# Patient Record
Sex: FEMALE | Race: WHITE
[De-identification: names, ages, dates, MRNs, and addresses within clinical notes are randomized per-mention and may not be internally consistent; named-entity substitution may affect disease eponyms.]

---

## 2017-02-20 ENCOUNTER — HOSPITAL ENCOUNTER (INPATIENT)
Dept: HOSPITAL 17 - PHED | Age: 47
LOS: 4 days | Discharge: HOME | DRG: 440 | End: 2017-02-24
Attending: FAMILY MEDICINE | Admitting: FAMILY MEDICINE
Payer: COMMERCIAL

## 2017-02-20 VITALS
SYSTOLIC BLOOD PRESSURE: 131 MMHG | TEMPERATURE: 98.2 F | RESPIRATION RATE: 16 BRPM | HEART RATE: 99 BPM | DIASTOLIC BLOOD PRESSURE: 86 MMHG | OXYGEN SATURATION: 95 %

## 2017-02-20 VITALS
RESPIRATION RATE: 18 BRPM | OXYGEN SATURATION: 97 % | SYSTOLIC BLOOD PRESSURE: 133 MMHG | DIASTOLIC BLOOD PRESSURE: 67 MMHG | HEART RATE: 77 BPM | TEMPERATURE: 97.7 F

## 2017-02-20 VITALS
RESPIRATION RATE: 18 BRPM | OXYGEN SATURATION: 96 % | SYSTOLIC BLOOD PRESSURE: 117 MMHG | DIASTOLIC BLOOD PRESSURE: 59 MMHG | HEART RATE: 76 BPM

## 2017-02-20 VITALS
SYSTOLIC BLOOD PRESSURE: 118 MMHG | RESPIRATION RATE: 16 BRPM | TEMPERATURE: 99 F | OXYGEN SATURATION: 97 % | HEART RATE: 75 BPM | DIASTOLIC BLOOD PRESSURE: 68 MMHG

## 2017-02-20 VITALS
HEART RATE: 83 BPM | OXYGEN SATURATION: 97 % | DIASTOLIC BLOOD PRESSURE: 75 MMHG | RESPIRATION RATE: 18 BRPM | SYSTOLIC BLOOD PRESSURE: 132 MMHG

## 2017-02-20 VITALS — OXYGEN SATURATION: 97 %

## 2017-02-20 VITALS — HEIGHT: 64 IN | WEIGHT: 215.17 LBS | BODY MASS INDEX: 36.73 KG/M2

## 2017-02-20 DIAGNOSIS — E11.9: ICD-10-CM

## 2017-02-20 DIAGNOSIS — F32.9: ICD-10-CM

## 2017-02-20 DIAGNOSIS — K86.1: Primary | ICD-10-CM

## 2017-02-20 DIAGNOSIS — I10: ICD-10-CM

## 2017-02-20 DIAGNOSIS — E78.5: ICD-10-CM

## 2017-02-20 DIAGNOSIS — K29.70: ICD-10-CM

## 2017-02-20 DIAGNOSIS — J45.909: ICD-10-CM

## 2017-02-20 DIAGNOSIS — E66.9: ICD-10-CM

## 2017-02-20 DIAGNOSIS — F41.8: ICD-10-CM

## 2017-02-20 LAB
ALP SERPL-CCNC: 77 U/L (ref 45–117)
ALT SERPL-CCNC: 61 U/L (ref 10–53)
ANION GAP SERPL CALC-SCNC: 10 MEQ/L (ref 5–15)
AST SERPL-CCNC: 25 U/L (ref 15–37)
BASOPHILS # BLD AUTO: 0 TH/MM3 (ref 0–0.2)
BASOPHILS NFR BLD: 0.5 % (ref 0–2)
BILIRUB SERPL-MCNC: 0.2 MG/DL (ref 0.2–1)
BUN SERPL-MCNC: 13 MG/DL (ref 7–18)
CHLORIDE SERPL-SCNC: 102 MEQ/L (ref 98–107)
EOSINOPHIL # BLD: 0.2 TH/MM3 (ref 0–0.4)
EOSINOPHIL NFR BLD: 2.3 % (ref 0–4)
ERYTHROCYTE [DISTWIDTH] IN BLOOD BY AUTOMATED COUNT: 12.2 % (ref 11.6–17.2)
GFR SERPLBLD BASED ON 1.73 SQ M-ARVRAT: 83 ML/MIN (ref 89–?)
HCO3 BLD-SCNC: 27.5 MEQ/L (ref 21–32)
HCT VFR BLD CALC: 46 % (ref 35–46)
HDLC SERPL-MCNC: 57.9 MG/DL (ref 40–60)
HEMO FLAGS: (no result)
LDLC SERPL-MCNC: 107 MG/DL (ref 0–99)
LYMPHOCYTES # BLD AUTO: 2.7 TH/MM3 (ref 1–4.8)
LYMPHOCYTES NFR BLD AUTO: 32 % (ref 9–44)
MCH RBC QN AUTO: 29.8 PG (ref 27–34)
MCHC RBC AUTO-ENTMCNC: 33.5 % (ref 32–36)
MCV RBC AUTO: 89.1 FL (ref 80–100)
MONOCYTES NFR BLD: 6.8 % (ref 0–8)
NEUTROPHILS # BLD AUTO: 5 TH/MM3 (ref 1.8–7.7)
NEUTROPHILS NFR BLD AUTO: 58.4 % (ref 16–70)
PLATELET # BLD: 217 TH/MM3 (ref 150–450)
POTASSIUM SERPL-SCNC: 3.7 MEQ/L (ref 3.5–5.1)
RBC # BLD AUTO: 5.17 MIL/MM3 (ref 4–5.3)
SODIUM SERPL-SCNC: 139 MEQ/L (ref 136–145)
WBC # BLD AUTO: 8.5 TH/MM3 (ref 4–11)

## 2017-02-20 PROCEDURE — 80320 DRUG SCREEN QUANTALCOHOLS: CPT

## 2017-02-20 PROCEDURE — 84702 CHORIONIC GONADOTROPIN TEST: CPT

## 2017-02-20 PROCEDURE — 82390 ASSAY OF CERULOPLASMIN: CPT

## 2017-02-20 PROCEDURE — 82787 IGG 1 2 3 OR 4 EACH: CPT

## 2017-02-20 PROCEDURE — 80074 ACUTE HEPATITIS PANEL: CPT

## 2017-02-20 PROCEDURE — 83883 ASSAY NEPHELOMETRY NOT SPEC: CPT

## 2017-02-20 PROCEDURE — 78226 HEPATOBILIARY SYSTEM IMAGING: CPT

## 2017-02-20 PROCEDURE — 84460 ALANINE AMINO (ALT) (SGPT): CPT

## 2017-02-20 PROCEDURE — 82784 ASSAY IGA/IGD/IGG/IGM EACH: CPT

## 2017-02-20 PROCEDURE — 96374 THER/PROPH/DIAG INJ IV PUSH: CPT

## 2017-02-20 PROCEDURE — 83010 ASSAY OF HAPTOGLOBIN QUANT: CPT

## 2017-02-20 PROCEDURE — 80061 LIPID PANEL: CPT

## 2017-02-20 PROCEDURE — 74177 CT ABD & PELVIS W/CONTRAST: CPT

## 2017-02-20 PROCEDURE — 86316 IMMUNOASSAY TUMOR OTHER: CPT

## 2017-02-20 PROCEDURE — 80053 COMPREHEN METABOLIC PANEL: CPT

## 2017-02-20 PROCEDURE — 96361 HYDRATE IV INFUSION ADD-ON: CPT

## 2017-02-20 PROCEDURE — 82941 ASSAY OF GASTRIN: CPT

## 2017-02-20 PROCEDURE — 83690 ASSAY OF LIPASE: CPT

## 2017-02-20 PROCEDURE — 83036 HEMOGLOBIN GLYCOSYLATED A1C: CPT

## 2017-02-20 PROCEDURE — 86038 ANTINUCLEAR ANTIBODIES: CPT

## 2017-02-20 PROCEDURE — 82172 ASSAY OF APOLIPOPROTEIN: CPT

## 2017-02-20 PROCEDURE — 80076 HEPATIC FUNCTION PANEL: CPT

## 2017-02-20 PROCEDURE — 84311 SPECTROPHOTOMETRY: CPT

## 2017-02-20 PROCEDURE — 82728 ASSAY OF FERRITIN: CPT

## 2017-02-20 PROCEDURE — 83550 IRON BINDING TEST: CPT

## 2017-02-20 PROCEDURE — 85025 COMPLETE CBC W/AUTO DIFF WBC: CPT

## 2017-02-20 PROCEDURE — 74181 MRI ABDOMEN W/O CONTRAST: CPT

## 2017-02-20 PROCEDURE — 82977 ASSAY OF GGT: CPT

## 2017-02-20 PROCEDURE — 82533 TOTAL CORTISOL: CPT

## 2017-02-20 PROCEDURE — 82103 ALPHA-1-ANTITRYPSIN TOTAL: CPT

## 2017-02-20 PROCEDURE — 82247 BILIRUBIN TOTAL: CPT

## 2017-02-20 PROCEDURE — 76377 3D RENDER W/INTRP POSTPROCES: CPT

## 2017-02-20 PROCEDURE — 83540 ASSAY OF IRON: CPT

## 2017-02-20 PROCEDURE — 83516 IMMUNOASSAY NONANTIBODY: CPT

## 2017-02-20 PROCEDURE — 96375 TX/PRO/DX INJ NEW DRUG ADDON: CPT

## 2017-02-20 PROCEDURE — A9537 TC99M MEBROFENIN: HCPCS

## 2017-02-20 PROCEDURE — 83520 IMMUNOASSAY QUANT NOS NONAB: CPT

## 2017-02-20 PROCEDURE — 86256 FLUORESCENT ANTIBODY TITER: CPT

## 2017-02-20 PROCEDURE — 82948 REAGENT STRIP/BLOOD GLUCOSE: CPT

## 2017-02-20 PROCEDURE — 86003 ALLG SPEC IGE CRUDE XTRC EA: CPT

## 2017-02-20 RX ADMIN — SODIUM CHLORIDE AND POTASSIUM CHLORIDE SCH MLS/HR: 9; 1.49 INJECTION, SOLUTION INTRAVENOUS at 21:57

## 2017-02-20 NOTE — HHI.HP
HPI


Service


[facility]


Primary Care Physician


Arya White M.D.


Admission Diagnosis


pancreatitis


Chief Complaint:  


abd pain


Travel History


International Travel<30 Days:  No


Contact w/Intl Traveler <30 Da:  No


Traveled to Known Affected Are:  No


History of Present Illness


46-year-old female with history of depression, anxiety, diabetes and htn is 

here with complaint of abdominal pain.  Patient states that she has had 

approximately 2.5 days of upper abdominal pain greatest in the epigastrium and 

right upper quadrant that radiates around to the back.  +Associated nausea, no 

vomiting.  No hematemesis. + Associated diarrhea.  No hematochezia.  Nausea is 

more of a feature than the diarrhea. Patient states that she has a history of 

pancreatitis, status post cholecystectomy and this feels similar to when she 

had pancreatitis exacerbation before.  She's had recurrent pancreatitis even 

after her cholecystectomy and states that it has been due to "using too much 

ibuprofen".  She denies a history of heavy alcohol abuse and actually doesn't 

drink any alcohol at all.  Patient has not had any new changes in home 

medications, but has started eating more fatty foods of late.  Patient has 

tried Zofran at home with some improvement of her symptoms.  He is feeling much 

better now since being provided with IV antibiotics and pain medication.  She 

has had approximately 3 episodes of pancreatitis in the past since 2013.





Review of Systems


Constitutional:  COMPLAINS OF: Change in appetite


Eyes:  DENIES: Blurred vision, Diplopia, Eye inflammation, Eye pain, Vision loss

, Photosensitivity, Double Vision


Respiratory:  DENIES: Apneas, Cough, Snoring, Wheezing, Hemoptysis, Sputum 

production, Shortness of breath


Cardiovascular:  DENIES: Chest pain, Palpitations, Syncope, Dyspnea on Exertion

, PND, Lower Extremity Edema, Orthopnea, Claudication


Gastrointestinal:  COMPLAINS OF: Abdominal pain, Diarrhea, Nausea,  DENIES: 

Black stools, Bloody stools, BRB per rectum, Constipation, GERD, Reflux, 

Vomiting, Difficulty Swallowing, Anorexia, See HPI


Integumentary:  COMPLAINS OF: Abnormal pigmentation, Rash


Immunologic/allergic:  DENIES: Eczema, Urticaria


Neurologic:  DENIES: Abnormal gait, Headache, Localized weakness, Paresthesias, 

Seizures, Speech Problems, Tremor, Poor Balance





Past Family Social History


Past Medical History


Asthma


Type 2 diabetes


Hyperlipidemia


Hypertension


Gastritis


Lumbar degenerative disc disease


Obesity


Anxiety


Depression


Past Surgical History


Appendectomy


 section 


Laparoscopic cholecystectomy


Exploratory laparotomy at age 20


Hysterectomy bilateral salpingo-oophorectomy in 


Reported Medications


Cinnamon 500 Mg Cap 1,000 Mg PO DAILY


Gnp Melatonin (Melatonin) 3 Mg Tab 1 Tab PO DAILY


Keira (Keira (Zingiber Officinalis)) 500 Mg Cap 1 Cap PO DAILY


Glucosamine (Glucosamine Sulfate) 500 Mg Cap 500 Mg PO DAILY


Fish Oil (Omega-3 Fatty Acids) 500 Mg Cap 1 Cap PO DAILY


Mobic (Meloxicam) 7.5 Mg Tab 7.5 Mg PO DAILY


Trazodone (Trazodone HCl) 150 Mg Tab 150 Mg PO HS


Celexa (Citalopram Hydrobromide) 40 Mg Tab 60 Mg PO DAILY


Farxiga (Dapagliflozin) 10 Mg Tab 10 Mg PO DAILY


Metformin (Metformin HCl) 1,000 Mg Tab 1,000 Mg PO BIDPC


     With meals


Flexeril (Cyclobenzaprine HCl) 10 Mg Tab 10 Mg PO TID


Hydrocodone-Acetaminophen 7.5-325 mg Tab 1 Tab PO DAILY PRN


Symbicort Inh (Budesonide/Formoterol Fumarate) 160-4.5 Mcg/Act Aero 1 Puff INH 

Q12HR


Proventil Hfa 6.7 GM Inh (Albuterol Sulfate) 90 Mcg/Act Aer 1 Puff INH Q4H PRN


Metoprolol Tartrate 25 Mg Tab 25 Mg PO BID


Lisinopril 5 Mg Tab 5 Mg PO DAILY


Singulair 10mg daily


Incruse Elipta 62.5 mcg 1 puff daily


Allergies:  


Coded Allergies:  


     Morphine (Verified  Allergy, Intermediate, HIVES, 17)


     Sulfa (Verified  Allergy, Intermediate, HIVES, 17)


     Pyridium (Verified  Adverse Reaction, Intermediate, VOMITING, 17)


Family History


Asthma and a mother and sister


Abdominal aortic aneurysm in her father 


diabetes


Coronary artery disease


Hypertension


Major depression


Social History


Denies alcohol, tobacco or illicit drug use


Works as a  with [employer].  She is an RN by 

training.


She has twin sons age 17.  Originally from [place], she has been in the area for 

9 years.





Physical Exam


Vital Signs





 Vital Signs








  Date Time  Temp Pulse Resp B/P Pulse Ox O2 Delivery O2 Flow Rate FiO2


 


17 18:26   18     


 


17 17:30  83 18 132/75 97 Room Air  


 


17 17:29     97 Room Air  


 


17 16:49 98.2 99 16 131/86 95   








Physical Exam


GENERAL: This is a well-nourished, obese, well-developed patient, in no 

apparent distress.  Alert and oriented.


SKIN: Mild erythematous patches on arms and upper chest.. Cool and dry.


HEAD: Atraumatic. Normocephalic. No temporal or scalp tenderness.


EYES: Pupils equal round and reactive. Extraocular motions intact. No scleral 

icterus. No injection or drainage. 


ENT: Nose without bleeding, purulent drainage or septal hematoma. Airway patent.


NECK: Trachea midline. No JVD or lymphadenopathy. Supple, nontender, no 

meningeal signs.


CARDIOVASCULAR: Regular rate and rhythm without murmurs, gallops, or rubs. 


RESPIRATORY: Clear to auscultation. Breath sounds equal bilaterally. No wheezes

, rales, or rhonchi.  


GASTROINTESTINAL: Abdomen soft, nondistended. No hepato-splenomegaly, or 

palpable masses. No guarding.  Mild tenderness to palpation in epigastrium.  No 

rebound.  Bowel sounds normal.


MUSCULOSKELETAL: Extremities without clubbing, cyanosis, or edema. No joint 

tenderness, effusion, or edema noted. No calf tenderness. 


NEUROLOGICAL: Awake and alert. Cranial nerves II through XII intact.  Motor and 

sensory grossly within normal limits. Five out of 5 muscle strength in all 

muscle groups.  Normal speech.


Laboratory





Laboratory Tests








Test 17





 17:25


 


White Blood Count 8.5 


 


Red Blood Count 5.17 


 


Hemoglobin 15.4 


 


Hematocrit 46.0 


 


Mean Corpuscular Volume 89.1 


 


Mean Corpuscular Hemoglobin 29.8 


 


Mean Corpuscular Hemoglobin 33.5 





Concent 


 


Red Cell Distribution Width 12.2 


 


Platelet Count 217 


 


Mean Platelet Volume 8.6 


 


Neutrophils (%) (Auto) 58.4 


 


Lymphocytes (%) (Auto) 32.0 


 


Monocytes (%) (Auto) 6.8 


 


Eosinophils (%) (Auto) 2.3 


 


Basophils (%) (Auto) 0.5 


 


Neutrophils # (Auto) 5.0 


 


Lymphocytes # (Auto) 2.7 


 


Monocytes # (Auto) 0.6 


 


Eosinophils # (Auto) 0.2 


 


Basophils # (Auto) 0.0 


 


CBC Comment DIFF FINAL 


 


Differential Comment  


 


Sodium Level 139 


 


Potassium Level 3.7 


 


Chloride Level 102 


 


Carbon Dioxide Level 27.5 


 


Anion Gap 10 


 


Blood Urea Nitrogen 13 


 


Creatinine 0.76 


 


Estimat Glomerular Filtration 83 





Rate 


 


Random Glucose 188 


 


Calcium Level 8.7 


 


Total Bilirubin 0.2 


 


Aspartate Amino Transf 25 





(AST/SGOT) 


 


Alanine Aminotransferase 61 





(ALT/SGPT) 


 


Alkaline Phosphatase 77 


 


Total Protein 7.1 


 


Albumin 4.0 


 


Triglycerides Level 306 


 


Cholesterol Level 226 


 


LDL Cholesterol 107 


 


HDL Cholesterol 57.9 


 


Cholesterol/HDL Ratio 3.90 


 


Lipase 2389 


 


Ethyl Alcohol Level LESS THAN 3 








Result Diagram:  


17 1725                                                                   

             17 172








Assessment and Plan


Problem List:  


(1) Pancreatitis


Status:  Acute


Plan:  seems to be recurrent issue.


Will follow clinically. IVF, pain meds. NPO initially, we'll now advanced to 

clears as she seems clinically much improved.


We'll check imaging if symptoms recur.  She reports that she's had CT scanning 

done of her abdomen on several occasions including most recently around 2016 per her report.





(2) DM2 (diabetes mellitus, type 2)


Status:  Chronic


Plan:  A1c 7.3 in Oct 2016. Recheck.


SSI.





(3) Asthma


Status:  Chronic


Plan:  nebs. follow clinically. inhalers as at home.





(4) Hypertension


Status:  Chronic


Plan:  Well controlled. continue rx





(5) Major depression


Status:  Chronic


Plan:  continue rx





Code Status


Full


Discussed Condition With


Patient and ER physician





Physician Certification


2 Midnight Certification Type:  Admission for Inpatient Services


Order for Inpatient Services


The services are ordered in accordance with Medicare regulations or non-

Medicare payer requirements, as applicable.  In the case of services not 

specified as inpatient-only, they are appropriately provided as inpatient 

services in accordance with the 2-midnight benchmark.


Estimated LOS (days):  2


 days is the estimated time the patient will need to remain in the hospital, 

assuming treatment plan goals are met and no additional complications.


Post-Hospital Plan:  Home





Problem Qualifiers





(1) Pancreatitis:  


Qualified Code:  K85.90 - Acute pancreatitis without infection or necrosis, 

unspecified pancreatitis type


(2) Hypertension:  


Qualified Code:  I10 - Essential hypertension


(3) Major depression:  





Raji Marmolejo PhD MD 2017 19:46

## 2017-02-20 NOTE — PD
HPI


Chief Complaint:  GI Complaint


Time Seen by Provider:  17:00


Travel History


International Travel<30 days:  No


Contact w/Intl Traveler<30days:  No


Traveled to known affect area:  No





History of Present Illness


HPI


46-year-old female with history of depression, anxiety, diabetes here with 

complaint of abdominal pain.  Patient states that she has had approximately 2.5 

days of upper abdominal pain greatest in the epigastrium and right upper 

quadrant that radiates around to the back.  Associated nausea, vomiting.  No 

hematemesis.  Associated diarrhea.  No hematochezia.  The nausea and vomiting 

are certainly more prominent than her diarrhea.  Patient states that she has a 

history of pancreatitis, status post cholecystectomy and this feels similar.  

She's had recurrent pancreatitis even after her cholecystectomy and states that 

it has been due to "using too much ibuprofen".  She denies a history of heavy 

alcohol abuse.  Patient has not had any new changes in home medications.  

Patient has tried Zofran at home with some improvement of her symptoms.





PFSH


Past Medical History


Asthma:  Yes


Anxiety:  Yes


Depression:  Yes


Heart Rhythm Problems:  No


Cancer:  No


Cardiac Catheterization:  No


Cardiovascular Problems:  Yes


Diabetes:  Yes


Patient Takes Glucophage:  Yes


Diminished Hearing:  No


Gastrointestinal Disorders:  Yes


Genitourinary:  No


Hypertension:  Yes


Implanted Vascular Access Dvce:  No


Musculoskeletal:  No


Neurologic:  No


Psychiatric:  Yes


Reproductive:  Yes (HYSTERECTOMY)


Respiratory:  Yes


Immunizations Current:  Yes


Migraines:  No


Pancreatitis:  Yes


Thyroid Disease:  No


Influenza Vaccination:  Yes


Pregnant?:  Not Pregnant


:  1


Para:  1





Past Surgical History


Abdominal Surgery:  Yes (ENDOMETRIAL TISSUE REMOVED )


Appendectomy:  Yes


 Section:  Yes ()


Cholecystectomy:  Yes


Coronary Artery Bypass Graft:  No


Gynecologic Surgery:  Yes (EXPLORATORY LAP , EXPL LAP  C CAUTERIZATION  

)


Hysterectomy:  Yes


Pacemaker:  No


Other Surgery:  Yes (EXP LAP, EXP ABD SURGERY)





Social History


Alcohol Use:  Yes (RARELY)


Tobacco Use:  No


Substance Use:  No





Allergies-Medications


(Allergen,Severity, Reaction):  


Coded Allergies:  


     Morphine (Verified  Allergy, Intermediate, HIVES, 17)


     Sulfa (Verified  Allergy, Intermediate, HIVES, 17)


     Pyridium (Verified  Adverse Reaction, Intermediate, VOMITING, 17)


Reported Meds & Prescriptions





Reported Meds & Active Scripts


Active


Reported


Cinnamon 500 Mg Cap 1,000 Mg PO DAILY


Gnp Melatonin (Melatonin) 3 Mg Tab 1 Tab PO DAILY


Keira (Keira (Zingiber Officinalis)) 500 Mg Cap 1 Cap PO DAILY


Glucosamine (Glucosamine Sulfate) 500 Mg Cap 500 Mg PO DAILY


Fish Oil (Omega-3 Fatty Acids) 500 Mg Cap 1 Cap PO DAILY


Mobic (Meloxicam) 7.5 Mg Tab 7.5 Mg PO DAILY


Trazodone (Trazodone HCl) 150 Mg Tab 150 Mg PO HS


Celexa (Citalopram Hydrobromide) 40 Mg Tab 60 Mg PO DAILY


Farxiga (Dapagliflozin) 10 Mg Tab 10 Mg PO DAILY


Metformin (Metformin HCl) 1,000 Mg Tab 1,000 Mg PO BIDPC


     With meals


Flexeril (Cyclobenzaprine HCl) 10 Mg Tab 10 Mg PO TID


Hydrocodone-Acetaminophen 7.5-325 mg Tab 1 Tab PO DAILY PRN


Symbicort Inh (Budesonide/Formoterol Fumarate) 160-4.5 Mcg/Act Aero 1 Puff INH 

Q12HR


Breo Ellipta Inh (Fluticasone/Vilanterol) 100-25 Mcg/Act Inh 1 Puff INH DAILY


     Use daily at the same time.


Proventil Hfa 6.7 GM Inh (Albuterol Sulfate) 90 Mcg/Act Aer 1 Puff INH Q4H PRN


Metoprolol Tartrate 25 Mg Tab 25 Mg PO BID


Lisinopril 5 Mg Tab 5 Mg PO DAILY








Review of Systems


Except as stated in HPI:  all other systems reviewed are Neg





Physical Exam


Narrative


GENERAL: Well-appearing female in no acute distress


SKIN: Warm and dry.


HEAD:  Normocephalic. 


EYES:  No scleral icterus. No injection or drainage. 


ENT:  Mucous membranes pink and moist.


NECK: Supple


CARDIOVASCULAR: Regular rate and rhythm.  


RESPIRATORY: No accessory muscle use. 


GASTROINTESTINAL: Abdomen soft, right upper quadrant and epigastric abdominal 

pain without rebound or guarding, no CVA tenderness.  Obese.


MUSCULOSKELETAL: Normal gait


NEUROLOGICAL: Awake and alert.  Normal speech.


PSYCHIATRIC: Appropriate mood and affect; insight and judgment normal.





Data


Data


Last Documented VS





Vital Signs








  Date Time  Temp Pulse Resp B/P Pulse Ox O2 Delivery O2 Flow Rate FiO2


 


17 17:30  83 18 132/75 97 Room Air  


 


17 16:49 98.2       








Orders





 Complete Blood Count With Diff (17 17:05)


Comprehensive Metabolic Panel (17 17:05)


Lipase (17 17:05)


Iv Access Insert/Monitor (17 17:05)


Ecg Monitoring (17 17:05)


Oximetry (17 17:05)


Ondansetron Inj (Zofran Inj) (17 17:15)


Sodium Chlor 0.9% 1000 Ml Inj (Ns 1000 M (17 17:05)


Sodium Chloride 0.9% Flush (Ns Flush) (17 17:15)


Ketorolac Inj (Toradol Inj) (17 17:15)





Labs





 Laboratory Tests








Test 17





 17:25


 


White Blood Count 8.5 TH/MM3


 


Red Blood Count 5.17 MIL/MM3


 


Hemoglobin 15.4 GM/DL


 


Hematocrit 46.0 %


 


Mean Corpuscular Volume 89.1 FL


 


Mean Corpuscular Hemoglobin 29.8 PG


 


Mean Corpuscular Hemoglobin 33.5 %





Concent 


 


Red Cell Distribution Width 12.2 %


 


Platelet Count 217 TH/MM3


 


Mean Platelet Volume 8.6 FL


 


Neutrophils (%) (Auto) 58.4 %


 


Lymphocytes (%) (Auto) 32.0 %


 


Monocytes (%) (Auto) 6.8 %


 


Eosinophils (%) (Auto) 2.3 %


 


Basophils (%) (Auto) 0.5 %


 


Neutrophils # (Auto) 5.0 TH/MM3


 


Lymphocytes # (Auto) 2.7 TH/MM3


 


Monocytes # (Auto) 0.6 TH/MM3


 


Eosinophils # (Auto) 0.2 TH/MM3


 


Basophils # (Auto) 0.0 TH/MM3


 


CBC Comment DIFF FINAL 


 


Differential Comment  


 


Sodium Level 139 MEQ/L


 


Potassium Level 3.7 MEQ/L


 


Chloride Level 102 MEQ/L


 


Carbon Dioxide Level 27.5 MEQ/L


 


Anion Gap 10 MEQ/L


 


Blood Urea Nitrogen 13 MG/DL


 


Creatinine 0.76 MG/DL


 


Estimat Glomerular Filtration 83 ML/MIN





Rate 


 


Random Glucose 188 MG/DL


 


Calcium Level 8.7 MG/DL


 


Total Bilirubin 0.2 MG/DL


 


Aspartate Amino Transf 25 U/L





(AST/SGOT) 


 


Alanine Aminotransferase 61 U/L





(ALT/SGPT) 


 


Alkaline Phosphatase 77 U/L


 


Total Protein 7.1 GM/DL


 


Albumin 4.0 GM/DL


 


Lipase 2389 U/L











[facility]


Medical Decision Making


Medical Screen Exam Complete:  Yes


Emergency Medical Condition:  Yes


Medical Record Reviewed:  Yes


Differential Diagnosis


46-year-old female with history of pancreatitis, diabetes status post 

cholecystectomy here with right upper quadrant, epigastric abdominal pain with 

nausea vomiting and diarrhea for the last 2.5 days similar previous episodes of 

pancreatitis.  Differential includes GERD, gastritis, pancreatitis, 

hepatobiliary pathology, gastroenteritis, and less likely bowel obstruction, 

pyelonephritis.


Narrative Course


Patient placed on monitor, IV established and blood obtained.  Patient given 1 

L normal saline bolus, 4 mg Zofran, 30 mg Toradol.  CBC, CMP, lipase obtained 

and notable for lipase of 2389.  Patient is still quite uncomfortable with 

nausea, vomiting.  Blood alcohol level, liver panel added onto labs.  Patient 

will be admitted for nothing by mouth, IV fluids.





Diagnosis





 Primary Impression:  


 Pancreatitis


 Qualified Code:  K85.90 - Acute pancreatitis without infection or necrosis, 

unspecified pancreatitis type


 Additional Impression:  


 Abdominal pain


 Qualified Code:  R10.10 - Pain of upper abdomen





Admitting Information


Admitting Physician Requests:  Admit








Ann Chauhan MD 2017 17:38

## 2017-02-21 VITALS
OXYGEN SATURATION: 96 % | TEMPERATURE: 97.9 F | SYSTOLIC BLOOD PRESSURE: 140 MMHG | RESPIRATION RATE: 18 BRPM | DIASTOLIC BLOOD PRESSURE: 87 MMHG | HEART RATE: 83 BPM

## 2017-02-21 VITALS — OXYGEN SATURATION: 97 %

## 2017-02-21 VITALS
RESPIRATION RATE: 18 BRPM | TEMPERATURE: 96.9 F | DIASTOLIC BLOOD PRESSURE: 84 MMHG | OXYGEN SATURATION: 96 % | HEART RATE: 69 BPM | SYSTOLIC BLOOD PRESSURE: 128 MMHG

## 2017-02-21 VITALS
TEMPERATURE: 98.2 F | SYSTOLIC BLOOD PRESSURE: 132 MMHG | RESPIRATION RATE: 18 BRPM | OXYGEN SATURATION: 96 % | HEART RATE: 69 BPM | DIASTOLIC BLOOD PRESSURE: 81 MMHG

## 2017-02-21 VITALS
TEMPERATURE: 98 F | RESPIRATION RATE: 18 BRPM | SYSTOLIC BLOOD PRESSURE: 113 MMHG | DIASTOLIC BLOOD PRESSURE: 52 MMHG | HEART RATE: 76 BPM | OXYGEN SATURATION: 97 %

## 2017-02-21 VITALS — OXYGEN SATURATION: 96 %

## 2017-02-21 VITALS
RESPIRATION RATE: 18 BRPM | DIASTOLIC BLOOD PRESSURE: 85 MMHG | HEART RATE: 70 BPM | TEMPERATURE: 98.1 F | SYSTOLIC BLOOD PRESSURE: 121 MMHG | OXYGEN SATURATION: 99 %

## 2017-02-21 VITALS
RESPIRATION RATE: 16 BRPM | DIASTOLIC BLOOD PRESSURE: 76 MMHG | OXYGEN SATURATION: 97 % | SYSTOLIC BLOOD PRESSURE: 119 MMHG | HEART RATE: 75 BPM

## 2017-02-21 LAB
ALP SERPL-CCNC: 51 U/L (ref 45–117)
ALT SERPL-CCNC: 72 U/L (ref 10–53)
ANION GAP SERPL CALC-SCNC: 7 MEQ/L (ref 5–15)
AST SERPL-CCNC: 44 U/L (ref 15–37)
BETA HCG QUANT: 3 MIU/ML (ref 0–5)
BILIRUB SERPL-MCNC: 0.5 MG/DL (ref 0.2–1)
BUN SERPL-MCNC: 13 MG/DL (ref 7–18)
CHLORIDE SERPL-SCNC: 109 MEQ/L (ref 98–107)
GFR SERPLBLD BASED ON 1.73 SQ M-ARVRAT: 107 ML/MIN (ref 89–?)
HCO3 BLD-SCNC: 25.6 MEQ/L (ref 21–32)
HEMOGLOBIN A1A: 1.1 %
HEMOGLOBIN A1B: 1.2 %
HEMOGLOBIN AO: 82.5 %
HEMOGLOBIN LA1C: 2.3 %
HEMOGLOBIN P3: 3.9 %
HGB F MFR BLD: 1.8 %
POTASSIUM SERPL-SCNC: 4.5 MEQ/L (ref 3.5–5.1)
SODIUM SERPL-SCNC: 142 MEQ/L (ref 136–145)

## 2017-02-21 RX ADMIN — ONDANSETRON PRN MG: 2 INJECTION, SOLUTION INTRAMUSCULAR; INTRAVENOUS at 07:44

## 2017-02-21 RX ADMIN — Medication SCH MG: at 21:13

## 2017-02-21 RX ADMIN — CLONIDINE HYDROCHLORIDE PRN MG: 0.1 INJECTION, SOLUTION EPIDURAL at 07:44

## 2017-02-21 RX ADMIN — INSULIN ASPART SCH: 100 INJECTION, SOLUTION INTRAVENOUS; SUBCUTANEOUS at 11:00

## 2017-02-21 RX ADMIN — INSULIN ASPART SCH: 100 INJECTION, SOLUTION INTRAVENOUS; SUBCUTANEOUS at 16:00

## 2017-02-21 RX ADMIN — HYDROCODONE BITARTRATE AND ACETAMINOPHEN PRN TAB: 5; 325 TABLET ORAL at 17:07

## 2017-02-21 RX ADMIN — METOPROLOL TARTRATE SCH MG: 25 TABLET, FILM COATED ORAL at 21:13

## 2017-02-21 RX ADMIN — CYCLOBENZAPRINE HYDROCHLORIDE SCH MG: 10 TABLET, FILM COATED ORAL at 17:06

## 2017-02-21 RX ADMIN — SODIUM CHLORIDE, PRESERVATIVE FREE SCH ML: 5 INJECTION INTRAVENOUS at 09:00

## 2017-02-21 RX ADMIN — SODIUM CHLORIDE, PRESERVATIVE FREE SCH ML: 5 INJECTION INTRAVENOUS at 20:05

## 2017-02-21 RX ADMIN — CYCLOBENZAPRINE HYDROCHLORIDE SCH MG: 10 TABLET, FILM COATED ORAL at 12:25

## 2017-02-21 RX ADMIN — INSULIN ASPART SCH: 100 INJECTION, SOLUTION INTRAVENOUS; SUBCUTANEOUS at 06:34

## 2017-02-21 RX ADMIN — HYDROCODONE BITARTRATE AND ACETAMINOPHEN PRN TAB: 5; 325 TABLET ORAL at 09:29

## 2017-02-21 RX ADMIN — BUDESONIDE AND FORMOTEROL FUMARATE DIHYDRATE SCH PUFF: 160; 4.5 AEROSOL RESPIRATORY (INHALATION) at 09:05

## 2017-02-21 RX ADMIN — INSULIN ASPART SCH: 100 INJECTION, SOLUTION INTRAVENOUS; SUBCUTANEOUS at 21:00

## 2017-02-21 RX ADMIN — SODIUM CHLORIDE AND POTASSIUM CHLORIDE SCH MLS/HR: 9; 1.49 INJECTION, SOLUTION INTRAVENOUS at 07:44

## 2017-02-21 RX ADMIN — METOPROLOL TARTRATE SCH MG: 25 TABLET, FILM COATED ORAL at 09:02

## 2017-02-21 RX ADMIN — CITALOPRAM SCH MG: 40 TABLET, FILM COATED ORAL at 21:14

## 2017-02-21 RX ADMIN — BUDESONIDE AND FORMOTEROL FUMARATE DIHYDRATE SCH PUFF: 160; 4.5 AEROSOL RESPIRATORY (INHALATION) at 21:13

## 2017-02-21 NOTE — RADHPO
EXAM DATE/TIME:  02/21/2017 14:40 

 

[facility] COMPARISON:     

No previous studies available for comparison.

       

 

 

INDICATIONS :     

Abdominal pain.

                     

 

MEDICAL HISTORY :     

Hypertension. Diabetes mellitus type 2.   

 

SURGICAL HISTORY :     

Cholecystectomy. Hysterectomy.   

 

ENCOUNTER:     

Initial

 

ACUITY:     

2 day

 

PAIN SCORE:     

4/10

 

LOCATION:       

upper quadrant Abdomen

 

TECHNIQUE:     

Multiplanar, multisequence magnetic resonance imaging of the abdomen was performed.  High-resolution 
3D dataset was utilized to reconstruct maximum-intensity projection (MIP) images.

 

FINDINGS:     

 

INTRAHEPATIC BILE DUCTS:     

Within normal limits. No significant anatomical variant is present.

 

EXTRAHEPATIC BILE DUCTS:     

The common bile duct measures 4 mm. No stone or filling defect is identified.

 

GALLBLADDER:     

No stones, wall thickening, or pericholecystic fluid.

 

LIVER:     

Normal size and signal intensity. No concerning liver lesion is identified on this non-contrast exam.


 

PANCREAS:     

The main pancreatic duct is normal in size.  There is no significant anatomical variant.  Signal inte
nsity is within normal limits.  No mass is visualized on this non-contrast exam.

 

OTHER:     

The remaining visualized structures demonstrate no acute abnormality on this non-contrast exam. There
 is loss of signal on axial opposed phase imaging.

 

CONCLUSION:     

1. Unremarkable MRCP.

2. No intra-or extrahepatic biliary ductal dilatation.

3. Mild fatty infiltration of the liver.

 

 

 

 Stanford Sanders MD on February 21, 2017 at 16:21           

Board Certified Radiologist.

 This report was verified electronically.

## 2017-02-21 NOTE — HHI.PR
Subjective


Remarks


Patient felt relatively well overnight and started having pain again in the 

right upper quadrant and epigastric area about 1 hour ago.


She prefers to avoid opiates if possible given a history of opiate addiction.  

She uses Norco at home very sparingly and has a pain contract with her outside 

primary care physician.


No nausea or vomiting overnight.  She has never had an MRCP and this is her 

fourth episode of pancreatitis in the last 3-1/2 years.





Objective


Vitals





 Vital Signs








  Date Time  Temp Pulse Resp B/P Pulse Ox O2 Delivery O2 Flow Rate FiO2


 


2/21/17 03:30 98.0 76 18 113/52 97 Room Air  


 


2/21/17 01:50     96   21


 


2/20/17 23:20  76 18 117/59 96 Room Air  


 


2/20/17 22:00 97.7 77 18 133/67 97 Room Air  


 


2/20/17 19:30 99.0 75 16 118/68 97 Room Air  


 


2/20/17 19:30   16     


 


2/20/17 18:26   18     


 


2/20/17 17:30  83 18 132/75 97 Room Air  


 


2/20/17 17:29     97 Room Air  


 


2/20/17 16:49 98.2 99 16 131/86 95   














 2/20/17 2/20/17 2/21/17





 15:00 23:00 07:00


 


Intake Total  250 ml 


 


Balance  250 ml 


 


   


 


Intake Oral  250 ml 


 


# Voids  1 








GENERAL: Standing by the side of the bed in mild distress regarding right upper 

quadrant pain.  Alert and oriented.  Cooperative with exam.


SKIN: Warm and dry.  Minimal erythematous patches on lateral arms, improved 

from previous exam.


HEAD: Normocephalic.


EYES: No scleral icterus. No injection or drainage. 


NECK: Supple, trachea midline. No JVD or lymphadenopathy.


CARDIOVASCULAR: Regular rate and rhythm without murmurs, gallops, or rubs. 


RESPIRATORY: Breath sounds equal bilaterally. No accessory muscle use.


GASTROINTESTINAL: Abdomen soft, nondistended.  Moderate tenderness in right 

upper quadrant with voluntary guarding.  No rebound.  Bowel sounds normal.


MUSCULOSKELETAL: No cyanosis, or edema. 


BACK: Nontender without obvious deformity. No CVA tenderness.





Result Diagram:  


2/20/17 1725                                                                   

             2/21/17 0630





Urinary Catheter:  No


Vascular Central Line Catheter:  No





A/P


Problem List:  


(1) Pancreatitis


Status:  Acute


Plan:  seems to be recurrent issue.


Will follow clinically. IVF, pain meds. 


Recurrence of pain this morning.  We'll check MRCP and have GI see the patient 

since the recurrent issue.


Patient prefers not opiate medications for pain management if possible.





(2) DM2 (diabetes mellitus, type 2)


Status:  Chronic


Plan:  A1c 7.3 in Oct 2016. Recheck.


SSI.





(3) Asthma


Status:  Chronic


Plan:  nebs. follow clinically. inhalers as at home.





(4) Hypertension


Status:  Chronic


Plan:  Well controlled. continue rx





(5) Major depression


Status:  Chronic


Plan:  continue rx





Discharge Planning


Hopefully discharge in next 1-2 days.





Problem Qualifiers





(1) Pancreatitis:  


Qualified Code:  K85.90 - Acute pancreatitis without infection or necrosis, 

unspecified pancreatitis type


(2) Hypertension:  


Qualified Code:  I10 - Essential hypertension


(3) Major depression:  





Raji Marmolejo PhD MD Feb 21, 2017 07:00

## 2017-02-22 VITALS
RESPIRATION RATE: 18 BRPM | SYSTOLIC BLOOD PRESSURE: 157 MMHG | OXYGEN SATURATION: 97 % | DIASTOLIC BLOOD PRESSURE: 95 MMHG | TEMPERATURE: 97.4 F | HEART RATE: 77 BPM

## 2017-02-22 VITALS
DIASTOLIC BLOOD PRESSURE: 85 MMHG | RESPIRATION RATE: 20 BRPM | OXYGEN SATURATION: 95 % | SYSTOLIC BLOOD PRESSURE: 129 MMHG | HEART RATE: 71 BPM | TEMPERATURE: 98.2 F

## 2017-02-22 VITALS
SYSTOLIC BLOOD PRESSURE: 142 MMHG | DIASTOLIC BLOOD PRESSURE: 85 MMHG | RESPIRATION RATE: 18 BRPM | TEMPERATURE: 98.8 F | HEART RATE: 77 BPM | OXYGEN SATURATION: 96 %

## 2017-02-22 VITALS
DIASTOLIC BLOOD PRESSURE: 88 MMHG | TEMPERATURE: 97.7 F | OXYGEN SATURATION: 97 % | HEART RATE: 78 BPM | SYSTOLIC BLOOD PRESSURE: 144 MMHG | RESPIRATION RATE: 18 BRPM

## 2017-02-22 LAB
ALP SERPL-CCNC: 46 U/L (ref 45–117)
ALT SERPL-CCNC: 78 U/L (ref 10–53)
ANION GAP SERPL CALC-SCNC: 8 MEQ/L (ref 5–15)
AST SERPL-CCNC: 40 U/L (ref 15–37)
BILIRUB SERPL-MCNC: 0.4 MG/DL (ref 0.2–1)
BUN SERPL-MCNC: 8 MG/DL (ref 7–18)
CHLORIDE SERPL-SCNC: 109 MEQ/L (ref 98–107)
FERRITIN SERPL-MCNC: 94 NG/ML (ref 8–252)
GFR SERPLBLD BASED ON 1.73 SQ M-ARVRAT: 120 ML/MIN (ref 89–?)
HCO3 BLD-SCNC: 27.1 MEQ/L (ref 21–32)
IGG SERPL-MCNC: 621 MG/DL (ref 670–1640)
POTASSIUM SERPL-SCNC: 4.1 MEQ/L (ref 3.5–5.1)
SODIUM SERPL-SCNC: 144 MEQ/L (ref 136–145)
TRANSFERRIN IRON PROFILE: 252 MG/DL (ref 200–360)

## 2017-02-22 RX ADMIN — CYCLOBENZAPRINE HYDROCHLORIDE SCH MG: 10 TABLET, FILM COATED ORAL at 17:23

## 2017-02-22 RX ADMIN — HYDROCODONE BITARTRATE AND ACETAMINOPHEN PRN TAB: 5; 325 TABLET ORAL at 06:21

## 2017-02-22 RX ADMIN — CITALOPRAM SCH MG: 40 TABLET, FILM COATED ORAL at 21:26

## 2017-02-22 RX ADMIN — ONDANSETRON PRN MG: 2 INJECTION, SOLUTION INTRAMUSCULAR; INTRAVENOUS at 06:22

## 2017-02-22 RX ADMIN — CLONIDINE HYDROCHLORIDE PRN MG: 0.1 INJECTION, SOLUTION EPIDURAL at 13:40

## 2017-02-22 RX ADMIN — HYDROCODONE BITARTRATE AND ACETAMINOPHEN PRN TAB: 5; 325 TABLET ORAL at 21:28

## 2017-02-22 RX ADMIN — ONDANSETRON PRN MG: 2 INJECTION, SOLUTION INTRAMUSCULAR; INTRAVENOUS at 12:38

## 2017-02-22 RX ADMIN — INSULIN ASPART SCH: 100 INJECTION, SOLUTION INTRAVENOUS; SUBCUTANEOUS at 21:00

## 2017-02-22 RX ADMIN — CYCLOBENZAPRINE HYDROCHLORIDE SCH MG: 10 TABLET, FILM COATED ORAL at 12:36

## 2017-02-22 RX ADMIN — BUDESONIDE AND FORMOTEROL FUMARATE DIHYDRATE SCH PUFF: 160; 4.5 AEROSOL RESPIRATORY (INHALATION) at 21:25

## 2017-02-22 RX ADMIN — METOPROLOL TARTRATE SCH MG: 25 TABLET, FILM COATED ORAL at 08:48

## 2017-02-22 RX ADMIN — HYDROCODONE BITARTRATE AND ACETAMINOPHEN PRN TAB: 5; 325 TABLET ORAL at 12:37

## 2017-02-22 RX ADMIN — Medication SCH MG: at 21:26

## 2017-02-22 RX ADMIN — SODIUM CHLORIDE, PRESERVATIVE FREE SCH ML: 5 INJECTION INTRAVENOUS at 08:51

## 2017-02-22 RX ADMIN — CYCLOBENZAPRINE HYDROCHLORIDE SCH MG: 10 TABLET, FILM COATED ORAL at 08:49

## 2017-02-22 RX ADMIN — SODIUM CHLORIDE, PRESERVATIVE FREE SCH ML: 5 INJECTION INTRAVENOUS at 21:29

## 2017-02-22 RX ADMIN — INSULIN ASPART SCH: 100 INJECTION, SOLUTION INTRAVENOUS; SUBCUTANEOUS at 06:05

## 2017-02-22 RX ADMIN — INSULIN ASPART SCH: 100 INJECTION, SOLUTION INTRAVENOUS; SUBCUTANEOUS at 16:00

## 2017-02-22 RX ADMIN — BUDESONIDE AND FORMOTEROL FUMARATE DIHYDRATE SCH PUFF: 160; 4.5 AEROSOL RESPIRATORY (INHALATION) at 08:49

## 2017-02-22 RX ADMIN — METOPROLOL TARTRATE SCH MG: 25 TABLET, FILM COATED ORAL at 21:25

## 2017-02-22 RX ADMIN — INSULIN ASPART SCH: 100 INJECTION, SOLUTION INTRAVENOUS; SUBCUTANEOUS at 11:41

## 2017-02-22 NOTE — HHI.PR
Subjective


Remarks


Feeling somewhat better today.  Slept well last night.


Less nausea and less pain.  Wants to try advancing diet.





Objective


Vitals





 Vital Signs








  Date Time  Temp Pulse Resp B/P Pulse Ox O2 Delivery O2 Flow Rate FiO2


 


2/21/17 20:45     97   21


 


2/21/17 20:00 96.9 69 18 128/84 96   


 


2/21/17 16:42     96   21


 


2/21/17 16:00 98.1 70 18 121/85 99   


 


2/21/17 12:00 98.2 69 18 132/81 96   


 


2/21/17 08:50 97.9 83 18 140/87 96   


 


2/21/17 07:15  75 16 119/76 97 Room Air  














 2/21/17 2/21/17 2/22/17





 15:00 23:00 07:00


 


Intake Total 1000 ml 800 ml 


 


Balance 1000 ml 800 ml 


 


   


 


Intake Oral 0 ml  


 


IV Total 1000 ml 800 ml 


 


# Voids 2  








GENERAL: Sleeping but arouses to voice.  Appears comfortable in no apparent 

distress.  Alert and oriented.  Cooperative with exam.


SKIN: Warm and dry.  Minimal erythematous patches on lateral arms which is 

stable with previous exam..


HEAD: Normocephalic.


EYES: No scleral icterus. No injection or drainage. 


NECK: Supple, trachea midline. No JVD or lymphadenopathy.


CARDIOVASCULAR: Regular rate and rhythm without murmurs, gallops, or rubs. 


RESPIRATORY: Breath sounds equal bilaterally. No accessory muscle use.


GASTROINTESTINAL: Abdomen soft, nondistended.  Mild tenderness in right upper 

quadrant.  No guarding.  No rebound.  Bowel sounds normal.


MUSCULOSKELETAL: No cyanosis, or edema. 


BACK: Nontender without obvious deformity. No CVA tenderness.


Result Diagram:  


2/20/17 1725                                                                   

             2/21/17 0630





Urinary Catheter:  No


Vascular Central Line Catheter:  No





A/P


Problem List:  


(1) Pancreatitis


Status:  Acute


Plan:  seems to be recurrent issue.


Will follow clinically. IVF, pain meds. 


MRCP negative.  GI is on the case.  Multiple labs pending.


Medically improved.  We'll advance diet.  If she tolerates bland diet, plan 

discharge home later today.





(2) DM2 (diabetes mellitus, type 2)


Status:  Chronic


Plan:  A1c 7.3 in Oct 2016, now 6.7. SSI and hospital.  Resume outpatient meds 

after discharge.





(3) Asthma


Status:  Chronic


Plan:  nebs. follow clinically. inhalers as at home.





(4) Hypertension


Status:  Chronic


Plan:  Well controlled. continue rx





(5) Major depression


Status:  Chronic


Plan:  continue rx





Discharge Planning


Hopefully discharge later today depending on her capacity to tolerate advanced 

diet.





Problem Qualifiers





(1) Pancreatitis:  


Qualified Code:  K85.90 - Acute pancreatitis without infection or necrosis, 

unspecified pancreatitis type


(2) Hypertension:  


Qualified Code:  I10 - Essential hypertension


(3) Major depression:  





Raji Marmolejo PhD MD Feb 22, 2017 05:54

## 2017-02-22 NOTE — HHI.DS
Discharge Summary


Admission Date


Feb 20, 2017 at 18:18


Discharge Date:  Feb 24, 2017


Admitting Diagnosis


pancreatitis





(1) Pancreatitis


Diagnosis:  Principal





(2) DM2 (diabetes mellitus, type 2)


Diagnosis:  Secondary





(3) Asthma


Diagnosis:  Secondary





(4) Hypertension


Diagnosis:  Secondary





(5) Major depression


Diagnosis:  Secondary





Consultants


Gastroenterology, Dr. MYLES


Brief History


46-year-old female with history of depression, anxiety, diabetes and htn is 

here with complaint of abdominal pain.  Patient states that she has had 

approximately 2.5 days of upper abdominal pain greatest in the epigastrium and 

right upper quadrant that radiates around to the back.  +Associated nausea, no 

vomiting.  No hematemesis. + Associated diarrhea.  No hematochezia.  Nausea is 

more of a feature than the diarrhea. Patient states that she has a history of 

pancreatitis, status post cholecystectomy and this feels similar to when she 

had pancreatitis exacerbation before.  She's had recurrent pancreatitis even 

after her cholecystectomy and states that it has been due to "using too much 

ibuprofen".  She denies a history of heavy alcohol abuse and actually doesn't 

drink any alcohol at all.  Patient has not had any new changes in home 

medications, but has started eating more fatty foods of late.  Patient has 

tried Zofran at home with some improvement of her symptoms.  He is feeling much 

better now since being provided with IV antibiotics and pain medication.  She 

has had approximately 3 episodes of pancreatitis in the past since 2013.


CBC/BMP:  


2/20/17 1725                                                                   

             2/21/17 0630





Significant Findings





Laboratory Tests








Test 2/20/17 2/21/17





 17:25 06:30


 


Hemoglobin 15.4 GM/DL 





 (11.6-15.3) 


 


Estimat Glomerular Filtration 83 ML/MIN (>89) 





Rate  


 


Random Glucose 188 MG/ MG/DL





 () ()


 


Alanine Aminotransferase 61 U/L (10-53) 72 U/L (10-53)





(ALT/SGPT)  


 


Triglycerides Level 306 MG/DL 





 () 


 


Cholesterol Level 226 MG/DL 





 (120-200) 


 


LDL Cholesterol 107 MG/DL 





 (0-99) 


 


Lipase 2389 U/L 1424 U/L





 () ()


 


Hemoglobin A1c 6.7 % (4.3-6.0) 


 


Chloride Level  109 MEQ/L





  ()


 


Calcium Level  8.1 MG/DL





  (8.5-10.1)


 


Aspartate Amino Transf  44 U/L (15-37)





(AST/SGOT)  


 


Total Protein  5.9 GM/DL





  (6.4-8.2)


 


Albumin  3.3 GM/DL





  (3.4-5.0)








Hospital Course


Patient presented with epigastric and right upper quadrant pain.  Her lipase 

was elevated and she was determined to have pancreatitis which is a recurrent 

problem for her.  She was given IV fluids and pain medications.  She improved 

somewhat but had a recurrence of her pain on hospital day #2.  She was 

evaluated by gastroenterology.  MRCP was negative.  Multiple labs were ordered 

in attempts to determine the etiology of her recurrent pancreatitis.  Many of 

those labs are still pending at time of d/c. Pt had recurrence of abd pain 

after eating. Pancreatic enzymes were added due to chronicity and recurrence of 

pancreatitis. CT and HIDA negative on day of d/c. Patient clinically improved 

and tolerated oral intake after addition of pancreatic enzymes.


Pt Condition on Discharge:  Stable


Discharge Disposition:  Discharge Home


Discharge Instructions


DIET: Follow Instructions for:  Diabetic Diet (low fat, bland initially), 

Gluten Free Diet


Speech Therapy-Diet Recommends:  Regular


Activities you can perform:  Regular-No Restrictions


Follow up Referrals:  


Gastroenterology


PCP Follow-up





New Orders:  


COMP MET PROF (CMP) - 2-3 Days


LIPASE - 2-3 Days





New Medications:  


Hydrocodone-Acetaminophen (Hydrocodone-Acetaminophen) 5-325 mg Tab


1 TAB PO Q8HR PRN breakthrough pain #15 Ref 0 TAB


Pantoprazole (Pantoprazole) 40 Mg Tab


40 MG PO DAILY Reflux #30 Ref 0 TAB


Dicyclomine (Bentyl) 10 Mg Cap


10 MG PO TID PRN SPASM #30 CAP


Pancrelipase (Creon) 24,000-76,000-120,000 Units Cap


2 CAP PO TID pancreatitis #180 CAP


 


Continued Medications:  


Albuterol 6.7 GM Inh (Proventil Hfa 6.7 GM Inh) 90 Mcg/Act Aer


1 PUFF INH Q4H PRN SHORTNESS OF BREATH #1 Ref 0 INHALER


Budesonide-Formoterol Inh (Symbicort Inh) 160-4.5 Mcg/Act Aero


1 PUFF INH Q12HR #1 Ref 0 INHALER


Cinnamon (Cinnamon) 500 Mg Cap


1000 MG PO DAILY #1 BOTTLE


Citalopram (Celexa) 40 Mg Tab


60 MG PO DAILY Control Depression #30 Ref 0 TAB


Cyclobenzaprine (Flexeril) 10 Mg Tab


10 MG PO TID Muscle Spasm #90 Ref 0 TAB


Dapagliflozin (Farxiga) 10 Mg Tab


10 MG PO DAILY Blood Sugar Management #30 Ref 0 TAB


Fluticasone-Vilanterol Inh (Breo Ellipta Inh) 100-25 Mcg/Act Inh


1 PUFF INH DAILY Use daily at the same time. #1 Ref 0 INHALER


Keira (Zingiber Officinalis) (Keira) 500 Mg Cap


1 CAP PO DAILY


Glucosamine (Glucosamine) 500 Mg Cap


500 MG PO DAILY Herbal Supplements Ref 0 CAP


Hydrocodone-Acetaminophen (Hydrocodone-Acetaminophen) 7.5-325 mg Tab


1 TAB PO DAILY PRN PAIN Ref 0 TAB


Lisdexamfetamine (Vyvanse) 30 Mg Cap


30 MG PO DAILY #30 Ref 0 CAP


Lisinopril (Lisinopril) 5 Mg Tab


5 MG PO DAILY Blood Pressure Management #30 Ref 0 TAB


Melatonin (Gnp Melatonin) 3 Mg Tab


1 TAB PO DAILY


Meloxicam (Mobic) 7.5 Mg Tab


7.5 MG PO DAILY Pain #31 Ref 0 TAB (This prescription has been renewed)


Metformin (Metformin) 1,000 Mg Tab


1000 MG PO BIDPC With meals Blood Sugar Management #60 Ref 0 TAB (This 

prescription has been renewed)


Metoprolol Tartrate (Metoprolol Tartrate) 25 Mg Tab


25 MG PO BID #60 Ref 0 TAB


Omega-3 Fatty Acids (Fish Oil) 500 Mg Cap


1 CAP PO DAILY


Trazodone (Trazodone) 150 Mg Tab


150 MG PO HS Control Depression #30 Ref 0 TAB











Raji Marmolejo PhD MD Feb 22, 2017 06:02

## 2017-02-22 NOTE — MB
cc:

CHARLEY MYLES M.D.

****

 

 

DATE OF CONSULTATION

17

 

REFERRING PHYSICIAN

Dr. Marmolejo

 

REASON FOR CONSULTATION

Recurrent pancreatitis.

 

HISTORY OF PRESENT ILLNESS

Ms. Estrada is a very pleasant 46-year-old lady admitted to the hospital with

recurrent pancreatitis.  The patient had abdominal pain, diarrhea, nausea but

no vomiting.  The pain was in epigastrium radiating to the back.   The patient

had similar episodes in the past. First one was in 2013- had one  recurrence 

afterwards. She had similar episode.  She had her gallbladder removed in 2013.

She denies any alcohol intake or any new medications.  She stated she does use

paleo diet /low fat but recently she had some butter and chicken skin that may 
have

contained more fat than normal.  She was evaluated in the past by Dr. Pepper for

what appeared to be elevated liver enzymes.  She was found to have elevation of

the smooth muscle antibody, was recommended a liver biopsy but that was never

done. During her cholecystectomy, no liver biopsy was performed.  She is also

currently seen by Dr. Lozoya for skin rash and joint pains also dating back

in .  She stated all the testing was negative so far.  She was also seen by

dermatology for the skin rash -  the idea of possible skin biopsy was

entertained but not done yet.  She is trying to lose weight and she tried a new

supplement that she started to a few weeks ago _glucomannan.

 

PAST MEDICAL HISTORY

1.  Asthma

2.  Obesity

3.  Type 2 diabetes,

4.  Hyperlipidemia,

5.  Hypertension.

6.  Gastritis

7.  Lumbar degenerative disease

8.  Anxiety depression.

 

PAST SURGICAL HISTORY

1.  ,

2.  Appendectomy,

3.  Cholecystectomy,

4.  Exploratory laparotomy at age 20,

5.  Hysterectomy.

 

MEDICATIONS

1.  Cinnamon

2.  Melatonin

3.  Keira

4.  Glucosamine

5.  Fish oil

6.  Mobic.

7.  Trazodone

8.  Celexa

9.  Farxiga

10. Metformin.

11.  Flexeril.

12.  Hydrocodone.

13.  Symbicort.

14.  Proventil.

15.  Metoprolol

16.  Lisinopril

17.  Singulair

18.  Alimta

 

ALLERGIES

MORPHINE

SULFA

PYRIDIUM  f

 

FAMILY HISTORY

Asthma,  diabetes, coronary artery disease, hypertension.

 

SOCIAL HISTORY

Denies any smoking, drinking or drug use.  Works as a  for [employer].

 

REVIEW OF SYSTEMS

She denies any fever or chills, weight loss or weight gain.

ENT:  No alteration in baseline hearing or visual acuity PULMONARY:  Denies any

chest pain, shortness of breath.  GASTROINTESTINAL:  As above.

GENITOURINARY::  Denies dysuria, hematuria.

HEMATOLOGIC:  No history of anemia or bleeding disorder.

SKIN:  No alteration in baseline skin lesion.

NEUROLOGIC:  No history of TIA or CVA kind of symptoms.

 

PHYSICAL EXAMINATION

GENERAL:  On clinical exam, she is sitting comfortably in bed in no acute

distress.

VITAL SIGNS: Temperature is 98.1, pulse 70, respiration 18, blood pressure

121/85, pulse ox 99.

HEENT: Pupils equal, round, reactive to light and accommodation.  NECK:  No

JVD.  No lymphadenopathy.

CHEST:  Clear to the auscultation and palpation.

CARDIOVASCULAR:  S1, S2.  No murmur.

ABDOMEN: Soft, obese.  Bowel sounds are present.

CNS:  Awake, alert, oriented x3.  No focal signs identified.  SKIN:   She has a

rash on her chest, arms.  She does have a cushingoid appearance.

 

White count is 8.5, hemoglobin 15.4, platelets 217.  PT/INR normal.  Her AST is

45, ALT 72, total protein 5.9, ______ 3.3,  triglycerides 306, cholesterol 226.

Her lipase was 389, currently 1424.  The patient had an MRCP which was

suggestive of fatty liver otherwise negative.

 

IMAGING STUDIES

CT abdomen and pelvis  was suggestive of normal CT,  no etiology for the

patient's clinical symptoms found, some significant fecal material throughout

the colon.

 

IMPRESSION

Recurrent pancreatitis, unclear etiology.  Does suspect medication-induced

versus biliary sludge, less likely autoimmune process but needs to be excluded.

History of peptic ulcer disease secondary to  NSAIDS. 

Elevation of the liver enzymes most likely fatty liver. 

History of elevation of the smooth muscle

antibody.

 

RECOMMENDATIONS

Advance diet as tolerated.  

EGD with endoscopic ultrasound as an outpatient.

We are going to send also hepatitis profile, antimitochondrial antibody, celiac

panel, iron ferritin , ceruloplasmin, alpha one antitrypsin, food allergies, 
liver

fibrosis, IgG 4:smooth muscle antibody and ARSEN were sent. 

Weight  loss.  

Avoid fatty foods. 

If discharged, followup in the office for further evaluation and

treatment. 

I would like to thank Dr. Marmolejo for referring her to our office

for consultation. 

Further recommendation will depend on the patient's clinical

status and the above results.

 

 

 

                            _________________________________

                             MD TRINITY Allen/

D:  2017/6:17 PM

T:  2017/8:34 AM

Visit #:  [id number]

Job #:  [id number]

[facility]

## 2017-02-23 VITALS
HEART RATE: 66 BPM | TEMPERATURE: 97.5 F | RESPIRATION RATE: 18 BRPM | SYSTOLIC BLOOD PRESSURE: 131 MMHG | OXYGEN SATURATION: 97 % | DIASTOLIC BLOOD PRESSURE: 82 MMHG

## 2017-02-23 VITALS
SYSTOLIC BLOOD PRESSURE: 121 MMHG | HEART RATE: 80 BPM | RESPIRATION RATE: 18 BRPM | TEMPERATURE: 98.2 F | DIASTOLIC BLOOD PRESSURE: 80 MMHG | OXYGEN SATURATION: 95 %

## 2017-02-23 VITALS
RESPIRATION RATE: 20 BRPM | TEMPERATURE: 98.3 F | SYSTOLIC BLOOD PRESSURE: 145 MMHG | OXYGEN SATURATION: 97 % | HEART RATE: 78 BPM | DIASTOLIC BLOOD PRESSURE: 93 MMHG

## 2017-02-23 LAB
ALP SERPL-CCNC: 71 U/L (ref 45–117)
ALT SERPL-CCNC: 134 U/L (ref 10–53)
ANION GAP SERPL CALC-SCNC: 10 MEQ/L (ref 5–15)
AST SERPL-CCNC: 70 U/L (ref 15–37)
BILIRUB SERPL-MCNC: 0.4 MG/DL (ref 0.2–1)
BUN SERPL-MCNC: 6 MG/DL (ref 7–18)
CHLORIDE SERPL-SCNC: 105 MEQ/L (ref 98–107)
GFR SERPLBLD BASED ON 1.73 SQ M-ARVRAT: 111 ML/MIN (ref 89–?)
HCO3 BLD-SCNC: 30.1 MEQ/L (ref 21–32)
POTASSIUM SERPL-SCNC: 3.7 MEQ/L (ref 3.5–5.1)
SODIUM SERPL-SCNC: 145 MEQ/L (ref 136–145)

## 2017-02-23 RX ADMIN — SODIUM CHLORIDE, PRESERVATIVE FREE SCH ML: 5 INJECTION INTRAVENOUS at 21:00

## 2017-02-23 RX ADMIN — SODIUM CHLORIDE, PRESERVATIVE FREE SCH ML: 5 INJECTION INTRAVENOUS at 12:58

## 2017-02-23 RX ADMIN — INSULIN ASPART SCH: 100 INJECTION, SOLUTION INTRAVENOUS; SUBCUTANEOUS at 06:11

## 2017-02-23 RX ADMIN — INSULIN ASPART SCH: 100 INJECTION, SOLUTION INTRAVENOUS; SUBCUTANEOUS at 16:00

## 2017-02-23 RX ADMIN — BUDESONIDE AND FORMOTEROL FUMARATE DIHYDRATE SCH PUFF: 160; 4.5 AEROSOL RESPIRATORY (INHALATION) at 07:45

## 2017-02-23 RX ADMIN — HYDROCODONE BITARTRATE AND ACETAMINOPHEN PRN TAB: 5; 325 TABLET ORAL at 12:57

## 2017-02-23 RX ADMIN — PANCRELIPASE SCH CAP: 24000; 76000; 120000 CAPSULE, DELAYED RELEASE PELLETS ORAL at 18:16

## 2017-02-23 RX ADMIN — INSULIN ASPART SCH: 100 INJECTION, SOLUTION INTRAVENOUS; SUBCUTANEOUS at 11:00

## 2017-02-23 RX ADMIN — CYCLOBENZAPRINE HYDROCHLORIDE SCH MG: 10 TABLET, FILM COATED ORAL at 17:51

## 2017-02-23 RX ADMIN — BUDESONIDE AND FORMOTEROL FUMARATE DIHYDRATE SCH PUFF: 160; 4.5 AEROSOL RESPIRATORY (INHALATION) at 21:00

## 2017-02-23 RX ADMIN — CYCLOBENZAPRINE HYDROCHLORIDE SCH MG: 10 TABLET, FILM COATED ORAL at 12:58

## 2017-02-23 RX ADMIN — METOPROLOL TARTRATE SCH MG: 25 TABLET, FILM COATED ORAL at 07:45

## 2017-02-23 RX ADMIN — HYDROCODONE BITARTRATE AND ACETAMINOPHEN PRN TAB: 5; 325 TABLET ORAL at 20:18

## 2017-02-23 RX ADMIN — Medication SCH MG: at 21:00

## 2017-02-23 RX ADMIN — INSULIN ASPART SCH: 100 INJECTION, SOLUTION INTRAVENOUS; SUBCUTANEOUS at 21:00

## 2017-02-23 RX ADMIN — CYCLOBENZAPRINE HYDROCHLORIDE SCH MG: 10 TABLET, FILM COATED ORAL at 07:45

## 2017-02-23 RX ADMIN — CITALOPRAM SCH MG: 40 TABLET, FILM COATED ORAL at 21:00

## 2017-02-23 RX ADMIN — METOPROLOL TARTRATE SCH MG: 25 TABLET, FILM COATED ORAL at 21:00

## 2017-02-23 NOTE — HHI.GIFU
GI Follow-up Note


Consult Follow-up


Subjective:  Patient laying in bed comfortably, today had multiple loose stools

, some fat seen in toilet bowl, abdominal pain, crampy like after eating .No 

nausea, vomiting , tolerated diet relatively well.Lipase went up slightly .she 

states she  gets episodes like this at home on/off 





Objective:


PHYSICAL EXAMINATION:


Vitals signs stable


No fever





 Vital Signs








  Date Time  Temp Pulse Resp B/P Pulse Ox O2 Delivery O2 Flow Rate FiO2


 


2/23/17 13:57   20     


 


2/23/17 12:00 97.5 66 18 131/82 97   








HEENT: Pupils round and reactive to light; normocephalic; atraumatic; no 

jaundice.  Throat is clear.


NECK: Neck is supple, no JVD, no lymphadenopathy.


CHEST:  Chest is clear to auscultation and percussion.


CARDIAC:  Regular rate and rhythm with no murmur gallop or rubs.


ABDOMEN:  Soft, nondistended, nontender; no hepatosplenomegaly; bowel sounds 

are present in all four quadrants,obese


EXTREMITIES: No clubbing, cyanosis, or edema.


SKIN:  Normal;  rash; no jaundice.


CNS:  No focal deficits; alert and oriented times three.


Available Data (labs, X- Rays, Procedues) : 





 Laboratory Tests








Test 2/21/17 2/22/17 2/23/17





 19:30 05:40 05:30


 


Iron Level 76 MCG/DL  


 


Total Iron Binding Capacity 353 MCG/DL  


 


Percent Iron Saturation 21.5 %  


 


Ferritin 94 NG/ML  


 


Random Cortisol 2.1 MCG/DL  


 


Immunoglobulin G Total 621 MG/DL  


 


Hepatitis A IgM Antibody NEGATIVE   


 


Hepatitis B Surface Antigen NEGATIVE   


 


Hepatitis B Core IgM Antibody NEGATIVE   


 


Hepatitis C Antibody NEGATIVE   


 


Alpha-1-Antitrypsin 111 mg/dL  


 


Sodium Level  144 MEQ/L 145 MEQ/L


 


Potassium Level  4.1 MEQ/L 3.7 MEQ/L


 


Chloride Level  109 MEQ/L 105 MEQ/L


 


Carbon Dioxide Level  27.1 MEQ/L 30.1 MEQ/L


 


Anion Gap  8 MEQ/L 10 MEQ/L


 


Blood Urea Nitrogen  8 MG/DL 6 MG/DL


 


Creatinine  0.55 MG/DL 0.59 MG/DL


 


Estimat Glomerular Filtration  120 ML/ ML/MIN





Rate   


 


Random Glucose  144 MG/ MG/DL


 


Calcium Level  8.4 MG/DL 8.8 MG/DL


 


Total Bilirubin  0.4 MG/DL 0.4 MG/DL


 


Aspartate Amino Transf  40 U/L 70 U/L





(AST/SGOT)   


 


Alanine Aminotransferase  78 U/L 134 U/L





(ALT/SGPT)   


 


Alkaline Phosphatase  46 U/L 71 U/L


 


Total Protein  5.9 GM/DL 6.6 GM/DL


 


Albumin  3.2 GM/DL 3.7 GM/DL


 


Lipase  692 U/L 1766 U/L














ASSESSMENT/PLAN:


recurrent pancreatis unclear etiology possible biliary, medication?


Possible pancreatic insufficiency based on stool description


elevated lfts, most likely secondary fatty liver





Recommendations


hida scan  to evaluate biliary tree-s/p cholecystectomy


start pancreatic enzymes with meals 


stool fat qualitative


stool c.diff


eus op=ordered


fu labs 


chromogranin, gastrin, porphyrins add to current labs


low cortisol-may need work-up op 


It was a pleasure seeing Jill Estrada. Thank you for this consult.


Entered by: Janina Singh MD Feb 23, 2017 17:22

## 2017-02-23 NOTE — HHI.PR
Subjective


Remarks


Feeling much better today.  Had recurrence of pain yesterday when she ate the 

advanced diet.


Slept well overnight.  No nausea.





Objective


Vitals





 Vital Signs








  Date Time  Temp Pulse Resp B/P Pulse Ox O2 Delivery O2 Flow Rate FiO2


 


2/22/17 22:43   18     


 


2/22/17 20:00 98.2 71 20 129/85 95   


 


2/22/17 16:00 97.7 78 18 144/88 97   


 


2/22/17 12:00 97.4 77 18 157/95 97   


 


2/22/17 08:00 98.8 77 18 142/85 96   














 2/22/17 2/22/17 2/23/17





 15:00 23:00 07:00


 


Intake Total  1440 ml 480 ml


 


Balance  1440 ml 480 ml


 


   


 


Intake Oral  1440 ml 480 ml


 


# Voids  6 3


 


# Bowel Movements  0 0








GENERAL: Appears comfortable in no apparent distress.  Alert and oriented.  

Cooperative with exam.


SKIN: Warm and dry.  


HEAD: Normocephalic.


EYES: No scleral icterus. No injection or drainage. 


NECK: Supple, trachea midline. No JVD or lymphadenopathy.


CARDIOVASCULAR: Regular rate and rhythm without murmurs, gallops, or rubs. 


RESPIRATORY: Breath sounds equal bilaterally. No accessory muscle use.


GASTROINTESTINAL: Abdomen soft, nondistended.  No tenderness to palpation on 

today's exam.  No guarding.  No rebound.  Bowel sounds normal.


MUSCULOSKELETAL: No cyanosis, or edema. 


BACK: Nontender without obvious deformity. No CVA tenderness.


Result Diagram:  


2/20/17 1725                                                                   

             2/23/17 0530





Urinary Catheter:  No


Vascular Central Line Catheter:  No





A/P


Problem List:  


(1) Pancreatitis


Status:  Acute


Plan:  seems to be recurrent issue.


Clinically much improved this morning but transaminases and lipase are elevated 

again.


MRCP negative.  GI is on the case.  Multiple labs pending guarding possible 

etiology of her recurrent pancreatitis.


We'll try diet again this morning.  If she tolerates well hopefully discharge 

home later today with close outpatient follow-up.





(2) DM2 (diabetes mellitus, type 2)


Status:  Chronic


Plan:  A1c 7.3 in Oct 2016, now 6.7. SSI in hospital.  Resume outpatient meds 

after discharge.





(3) Asthma


Status:  Chronic


Plan:  nebs. follow clinically. inhalers as at home.





(4) Hypertension


Status:  Chronic


Plan:  Well controlled. continue rx





(5) Major depression


Status:  Chronic


Plan:  continue rx





Discharge Planning


Hopefully discharge later today depending on her capacity to tolerate advanced 

diet.


Will need close follow-up as outpatient with labs and primary care.





Problem Qualifiers





(1) Pancreatitis:  


Qualified Code:  K85.90 - Acute pancreatitis without infection or necrosis, 

unspecified pancreatitis type


(2) Hypertension:  


Qualified Code:  I10 - Essential hypertension


(3) Major depression:  





Raji Marmolejo PhD MD Feb 23, 2017 07:46

## 2017-02-24 VITALS
RESPIRATION RATE: 17 BRPM | SYSTOLIC BLOOD PRESSURE: 148 MMHG | TEMPERATURE: 99 F | OXYGEN SATURATION: 96 % | DIASTOLIC BLOOD PRESSURE: 92 MMHG | HEART RATE: 81 BPM

## 2017-02-24 VITALS
DIASTOLIC BLOOD PRESSURE: 84 MMHG | HEART RATE: 76 BPM | OXYGEN SATURATION: 97 % | SYSTOLIC BLOOD PRESSURE: 134 MMHG | RESPIRATION RATE: 18 BRPM | TEMPERATURE: 97.3 F

## 2017-02-24 VITALS
HEART RATE: 84 BPM | TEMPERATURE: 98.2 F | DIASTOLIC BLOOD PRESSURE: 88 MMHG | OXYGEN SATURATION: 98 % | RESPIRATION RATE: 20 BRPM | SYSTOLIC BLOOD PRESSURE: 146 MMHG

## 2017-02-24 LAB
ALMOND CLASS: 0
ALMOND: (no result) KU/L
ALP SERPL-CCNC: 80 U/L (ref 45–117)
ALT SERPL-CCNC: 108 U/L (ref 10–53)
AST SERPL-CCNC: 38 U/L (ref 15–37)
BILIRUB INDIRECT SERPL-MCNC: 0.3 MG/DL (ref 0–0.8)
BILIRUB SERPL-MCNC: 0.4 MG/DL (ref 0.2–1)
CASHEW CLASS: 0
DEPRECATED CODFISH IGE RAST QL: 0
DEPRECATED PEANUT IGE RAST QL: 0
DEPRECATED SCALLOP IGE RAST QL: 0
DEPRECATED SESAME SEED IGE RAST QL: 0
DEPRECATED SHRIMP IGE RAST QL: 0
DEPRECATED SOYBEAN IGE RAST QL: 0
DEPRECATED WALNUT IGE RAST QL: 0
DEPRECATED WHEAT IGE RAST QL: 0
EGG WHITE IGE QN: 0.13 KU/L
FIBROSIS STAGE: (no result)
GGT SERPL-CCNC: 118 U/L (ref 3–55)
HAPTOGLOB SERPL-MCNC: 58 MG/DL (ref 43–212)
HAZELNUT CLASS: 0
HAZELNUT: (no result) KU/L
IGA SERPL-MCNC: 195 MG/DL (ref 81–463)
Lab: (no result)
Lab: 0.5 MG/DL (ref 0.2–1.2)
Lab: 193 MG/DL (ref 106–279)
Lab: 68 U/L (ref 6–29)
MILK IGE QN: 0.16 KU/L
MITOCHONDRIA AB SER QL: (no result) U
NECROINFLAMMAT ACT SCORE: 0.41
PEANUT IGE QN: (no result) KU/L
REFERENCE ID: (no result)
SALMON CLASS: 0
SALMON: (no result) KU/L
SCALLOP IGE QN: (no result) KU/L
SESAME SEED IGE QN: (no result) KU/L
SHRIMP IGE QN: (no result) KU/L
SOYBEAN IGE QN: (no result) KU/L
TISSUE TRANSGLUTAMINASE AB IGG: (no result) U/ML
TUNA CLASS: 0
TUNA: (no result) KU/L
WALNUT IGE QN: (no result) KU/L
WHEAT IGE QN: (no result) KU/L

## 2017-02-24 RX ADMIN — SODIUM CHLORIDE, PRESERVATIVE FREE SCH ML: 5 INJECTION INTRAVENOUS at 08:58

## 2017-02-24 RX ADMIN — METOPROLOL TARTRATE SCH MG: 25 TABLET, FILM COATED ORAL at 08:57

## 2017-02-24 RX ADMIN — INSULIN ASPART SCH: 100 INJECTION, SOLUTION INTRAVENOUS; SUBCUTANEOUS at 06:22

## 2017-02-24 RX ADMIN — PANCRELIPASE SCH CAP: 24000; 76000; 120000 CAPSULE, DELAYED RELEASE PELLETS ORAL at 12:19

## 2017-02-24 RX ADMIN — CYCLOBENZAPRINE HYDROCHLORIDE SCH MG: 10 TABLET, FILM COATED ORAL at 12:19

## 2017-02-24 RX ADMIN — PANCRELIPASE SCH CAP: 24000; 76000; 120000 CAPSULE, DELAYED RELEASE PELLETS ORAL at 09:00

## 2017-02-24 RX ADMIN — INSULIN ASPART SCH: 100 INJECTION, SOLUTION INTRAVENOUS; SUBCUTANEOUS at 11:00

## 2017-02-24 RX ADMIN — ONDANSETRON PRN MG: 2 INJECTION, SOLUTION INTRAMUSCULAR; INTRAVENOUS at 12:46

## 2017-02-24 RX ADMIN — HYDROCODONE BITARTRATE AND ACETAMINOPHEN PRN TAB: 5; 325 TABLET ORAL at 12:19

## 2017-02-24 RX ADMIN — ONDANSETRON PRN MG: 2 INJECTION, SOLUTION INTRAMUSCULAR; INTRAVENOUS at 06:09

## 2017-02-24 RX ADMIN — BUDESONIDE AND FORMOTEROL FUMARATE DIHYDRATE SCH PUFF: 160; 4.5 AEROSOL RESPIRATORY (INHALATION) at 08:57

## 2017-02-24 RX ADMIN — HYDROCODONE BITARTRATE AND ACETAMINOPHEN PRN TAB: 5; 325 TABLET ORAL at 06:13

## 2017-02-24 RX ADMIN — CYCLOBENZAPRINE HYDROCHLORIDE SCH MG: 10 TABLET, FILM COATED ORAL at 08:57

## 2017-02-24 NOTE — RADHPO
EXAM DATE/TIME:  2017 08:07 

 

[facility] COMPARISON:     

CT ABDOMEN & PELVIS W CONTRAST, 2014, 1:26.

 

 

INDICATIONS :     

Epigastric and right upper quadrant pain. 

                      

 

IV CONTRAST:     

85 cc Omnipaque 350 (iohexol) IV 

 

 

ORAL CONTRAST:      

Prescribed oral contrast ingested.

                      

 

RADIATION DOSE:     

20.37 CTDIvol (mGy) 

 

 

MEDICAL HISTORY :     

Diabetes mellitus type 2. Gastroesophageal reflux disease. Pancreatitis.Asthma.  Hypertension.  

 

SURGICAL HISTORY :      

Appendectomy. Cholecystectomy. section.Hysterectomy.

 

ENCOUNTER:      

Initial

 

ACUITY:      

4 - 6 days

 

PAIN SCALE:      

5/10

 

LOCATION:       

Right upper quadrant 

 

TECHNIQUE:     

Volumetric scanning of the abdomen and pelvis was performed.  Using automated exposure control and ad
justment of the mA and/or kV according to patient size, radiation dose was kept as low as reasonably 
achievable to obtain optimal diagnostic quality images. 

 

FINDINGS:     

 

LOWER LUNGS:     

The visualized lower lungs are clear.

 

LIVER:     

Homogeneous density without lesion. The liver measures 22.4 cm. There is fatty infiltration of the li
joan.  There is no dilation of the biliary tree.  No gallbladder, surgically removed..

 

SPLEEN:     

Normal size without lesion.

 

PANCREAS:     

Within normal limits.

 

KIDNEYS:     

Normal in size and shape.  There is no mass, stone or hydronephrosis. Extrarenal pelvis on the right 
side. This is stable.

 

ADRENAL GLANDS:     

Within normal limits.

 

VASCULAR:     

There is no aortic aneurysm.

 

BOWEL/MESENTERY:     

The stomach, small bowel, and colon demonstrate no acute abnormality.  There is no free intraperitone
al air or fluid.

 

ABDOMINAL WALL:     

Within normal limits.

 

RETROPERITONEUM:     

There is no lymphadenopathy. 

 

BLADDER:     

No wall thickening or mass. 

 

REPRODUCTIVE:     

Within normal limits.

 

INGUINAL:     

There is no lymphadenopathy or hernia. 

 

MUSCULOSKELETAL:     

Within normal limits for patient age. 

 

No new significant changes compared to .

 

CONCLUSION:     

1. Fatty infiltration of the liver. The liver is mildly enlarged. No significant change

2. No new or significant changes compared to the prior examination from .

3. No acute pathology.

 

 

 

 Antwon Sims MD on 2017 at 9:12           

Board Certified Radiologist.

 This report was verified electronically.

## 2017-02-24 NOTE — HHI.PR
Subjective


Remarks


Had 5 loose stools yesterday but no bowel movement since.  No hematochezia or 

melena.


Tolerated regular diet last night without any cramping or abdominal pain 

afterwards.  She does have some nausea this morning but that may be associated 

with the oral contrast she's drinking.  Overall feeling better.





Objective


Vitals





 Vital Signs








  Date Time  Temp Pulse Resp B/P Pulse Ox O2 Delivery O2 Flow Rate FiO2


 


2/24/17 00:00 98.2 84 20 146/88 98   


 


2/23/17 20:00 98.3 78 20 145/93 97   


 


2/23/17 13:57   20     


 


2/23/17 12:00 97.5 66 18 131/82 97   


 


2/23/17 08:00 98.2 80 18 121/80 95   














 2/23/17 2/23/17 2/24/17





 15:00 23:00 07:00


 


Intake Total  0 ml 0 ml


 


Balance  0 ml 0 ml


 


   


 


IV Total  0 ml 0 ml








GENERAL: Appears comfortable in no apparent distress.  Alert and oriented.  

Cooperative with exam.


SKIN: Warm and dry.  Mildly erythematous patches on forearms and arms.  Few 

areas of ecchymosis on forearms from recent lab draws.


HEAD: Normocephalic.


EYES: No scleral icterus. No injection or drainage. 


NECK: Supple, trachea midline. No JVD or lymphadenopathy.


CARDIOVASCULAR: Regular rate and rhythm without murmurs, gallops, or rubs. 


RESPIRATORY: Breath sounds equal bilaterally. No accessory muscle use.


GASTROINTESTINAL: Abdomen soft, nondistended.  No tenderness to palpation.  No 

guarding.  No rebound.  Bowel sounds normal.


MUSCULOSKELETAL: No cyanosis, or edema. 


BACK: Nontender without obvious deformity. No CVA tenderness


Result Diagram:  


2/20/17 1725                                                                   

             2/23/17 0530





Urinary Catheter:  No


Vascular Central Line Catheter:  No





A/P


Problem List:  


(1) Pancreatitis


Status:  Acute


Plan:  seems to be recurrent issue.


Clinically stable.  Tolerated oral intake well last night.  We'll continue 

Creon.


MRCP negative.  GI is on the case.  Multiple labs pending guarding possible 

etiology of her recurrent pancreatitis.


CT pending.  HIDA scan ordered by GI.  Hopefully discharge home today if she 

tolerates food well.





(2) DM2 (diabetes mellitus, type 2)


Status:  Chronic


Plan:  A1c 7.3 in Oct 2016, now 6.7. SSI in hospital.  Resume outpatient meds 

after discharge.





(3) Asthma


Status:  Chronic


Plan:  nebs. follow clinically. inhalers as at home.





(4) Hypertension


Status:  Chronic


Plan:  Fairly well controlled. continue rx





(5) Major depression


Status:  Chronic


Plan:  continue rx





Discharge Planning


Hopefully discharge later today depending on her capacity to tolerate advanced 

diet, CT results and HIDA scan.





Problem Qualifiers





(1) Pancreatitis:  


Qualified Code:  K85.90 - Acute pancreatitis without infection or necrosis, 

unspecified pancreatitis type


(2) Hypertension:  


Qualified Code:  I10 - Essential hypertension


(3) Major depression:  





Raji Marmolejo PhD MD Feb 24, 2017 06:57

## 2017-02-24 NOTE — RADHPO
EXAM DATE/TIME:  02/24/2017 10:05 

 

[facility] COMPARISON:     

CT ABDOMEN & PELVIS W CONTRAST, February 24, 2017, 8:07.

 

 

INDICATIONS :      

Abdominal pain with nausea and vomiting for one day.

                   

 

DOSE:      

4.1 mCi Tc99m Mebrofenin IV 

                   

                   

 

MEDICAL HISTORY :     

Pancreatitis. Diabetes mellitus type 2. Hypertension. 

 

SURGICAL HISTORY :      

Cholecystectomy.  Hysterectomy. Appendectomy. 

 

ENCOUNTER:     

Initial

 

ACUITY:     

1 day

 

PAIN SCALE:     

5/10

 

LOCATION:      

Right upper quadrant 

 

TECHNIQUE:     

Following the intravenous administration of radiotracer, dynamic sequential images were performed wit
h continuous acquisition.

 

FINDINGS:     

 

HEPATIC KINETICS:     

There is prompt uptake of radiotracer in the liver.  The liver is enlarged. No focal defects are seen
.  There is normal rate of washout from the hepatic parenchyma.

 

BILIARY CLEARANCE:     

Activity is first seen in the extrahepatic biliary system at 15 minutes.  There is normal excretion i
nto the small bowel.

 

GALLBLADDER:     

Surgically absent

 

BILIARY ENTRIC REFLUX:     

Some seen at the very end of the examination after 45 minutes of acquisition.

 

CONCLUSION:     

Hepatomegaly without evidence of biliary obstruction. Some degree of bile gastric reflux noted.

 

 

 

 Francisco Callahan MD on February 24, 2017 at 10:59           

Board Certified Radiologist.

 This report was verified electronically.

## 2017-02-25 LAB
ENDOMYSIUM AB TITR SER: (no result) {TITER}
TISSUE TRANSGLUTAMINASE AB: (no result) U/ML

## 2017-02-26 LAB
IGG SERUM-IGG SUBCLASSES: 601 MG/DL (ref 694–1618)
IGG SUBCLASSES 1: 301 MG/DL (ref 382–929)
IGG SUBCLASSES 2: 291 MG/DL (ref 241–700)
IGG SUBCLASSES 3: 34 MG/DL (ref 22–178)
IGG4 SER-MCNC: 15.9 MG/DL (ref 4–86)

## 2017-02-27 LAB
CLINICAL BIOCHEMIST REVIEW: (no result)
REVIEWED BY: (no result)

## 2017-03-06 ENCOUNTER — HOSPITAL ENCOUNTER (OUTPATIENT)
Dept: HOSPITAL 17 - NEPC | Age: 47
Setting detail: OBSERVATION
LOS: 2 days | Discharge: HOME | End: 2017-03-08
Attending: HOSPITALIST | Admitting: HOSPITALIST
Payer: COMMERCIAL

## 2017-03-06 VITALS — WEIGHT: 213.85 LBS | BODY MASS INDEX: 36.51 KG/M2 | HEIGHT: 64 IN

## 2017-03-06 VITALS
OXYGEN SATURATION: 97 % | TEMPERATURE: 97.9 F | RESPIRATION RATE: 20 BRPM | SYSTOLIC BLOOD PRESSURE: 155 MMHG | HEART RATE: 82 BPM | DIASTOLIC BLOOD PRESSURE: 85 MMHG

## 2017-03-06 DIAGNOSIS — K29.70: Primary | ICD-10-CM

## 2017-03-06 DIAGNOSIS — K21.9: ICD-10-CM

## 2017-03-06 DIAGNOSIS — E11.9: ICD-10-CM

## 2017-03-06 DIAGNOSIS — K76.0: ICD-10-CM

## 2017-03-06 DIAGNOSIS — R79.89: ICD-10-CM

## 2017-03-06 DIAGNOSIS — I10: ICD-10-CM

## 2017-03-06 DIAGNOSIS — Z87.11: ICD-10-CM

## 2017-03-06 DIAGNOSIS — J45.909: ICD-10-CM

## 2017-03-06 DIAGNOSIS — E78.00: ICD-10-CM

## 2017-03-06 DIAGNOSIS — Z79.84: ICD-10-CM

## 2017-03-06 DIAGNOSIS — K86.1: ICD-10-CM

## 2017-03-06 LAB
ALP SERPL-CCNC: 62 U/L (ref 45–117)
ALT SERPL-CCNC: 50 U/L (ref 10–53)
ANION GAP SERPL CALC-SCNC: 8 MEQ/L (ref 5–15)
AST SERPL-CCNC: 26 U/L (ref 15–37)
BASOPHILS # BLD AUTO: 0.1 TH/MM3 (ref 0–0.2)
BASOPHILS NFR BLD: 1 % (ref 0–2)
BILIRUB SERPL-MCNC: 0.5 MG/DL (ref 0.2–1)
BUN SERPL-MCNC: 11 MG/DL (ref 7–18)
CHLORIDE SERPL-SCNC: 101 MEQ/L (ref 98–107)
COLOR UR: (no result)
COMMENT (UR): (no result)
CULTURE IF INDICATED: (no result)
EOSINOPHIL # BLD: 0.2 TH/MM3 (ref 0–0.4)
EOSINOPHIL NFR BLD: 1.9 % (ref 0–4)
ERYTHROCYTE [DISTWIDTH] IN BLOOD BY AUTOMATED COUNT: 12.9 % (ref 11.6–17.2)
GFR SERPLBLD BASED ON 1.73 SQ M-ARVRAT: 84 ML/MIN (ref 89–?)
GLUCOSE UR STRIP-MCNC: 1000 MG/DL
HCO3 BLD-SCNC: 28.2 MEQ/L (ref 21–32)
HCT VFR BLD CALC: 43 % (ref 35–46)
HEMO FLAGS: (no result)
HGB UR QL STRIP: (no result)
KETONES UR STRIP-MCNC: (no result) MG/DL
LYMPHOCYTES # BLD AUTO: 4.2 TH/MM3 (ref 1–4.8)
LYMPHOCYTES NFR BLD AUTO: 45.3 % (ref 9–44)
MCH RBC QN AUTO: 30.6 PG (ref 27–34)
MCHC RBC AUTO-ENTMCNC: 34.5 % (ref 32–36)
MCV RBC AUTO: 88.7 FL (ref 80–100)
MONOCYTES NFR BLD: 6.4 % (ref 0–8)
NEUTROPHILS # BLD AUTO: 4.2 TH/MM3 (ref 1.8–7.7)
NEUTROPHILS NFR BLD AUTO: 45.4 % (ref 16–70)
NITRITE UR QL STRIP: (no result)
PLATELET # BLD: 227 TH/MM3 (ref 150–450)
POTASSIUM SERPL-SCNC: 3.8 MEQ/L (ref 3.5–5.1)
RBC # BLD AUTO: 4.85 MIL/MM3 (ref 4–5.3)
SODIUM SERPL-SCNC: 137 MEQ/L (ref 136–145)
SP GR UR STRIP: 1.01 (ref 1–1.03)
SQUAMOUS #/AREA URNS HPF: 1 /HPF (ref 0–5)
WBC # BLD AUTO: 9.2 TH/MM3 (ref 4–11)

## 2017-03-06 PROCEDURE — 88305 TISSUE EXAM BY PATHOLOGIST: CPT

## 2017-03-06 PROCEDURE — 80053 COMPREHEN METABOLIC PANEL: CPT

## 2017-03-06 PROCEDURE — 99284 EMERGENCY DEPT VISIT MOD MDM: CPT

## 2017-03-06 PROCEDURE — 43239 EGD BIOPSY SINGLE/MULTIPLE: CPT

## 2017-03-06 PROCEDURE — 83690 ASSAY OF LIPASE: CPT

## 2017-03-06 PROCEDURE — 81001 URINALYSIS AUTO W/SCOPE: CPT

## 2017-03-06 PROCEDURE — 82948 REAGENT STRIP/BLOOD GLUCOSE: CPT

## 2017-03-06 PROCEDURE — 85025 COMPLETE CBC W/AUTO DIFF WBC: CPT

## 2017-03-06 PROCEDURE — C9113 INJ PANTOPRAZOLE SODIUM, VIA: HCPCS

## 2017-03-06 PROCEDURE — G0378 HOSPITAL OBSERVATION PER HR: HCPCS

## 2017-03-06 PROCEDURE — 88312 SPECIAL STAINS GROUP 1: CPT

## 2017-03-06 PROCEDURE — 00740: CPT

## 2017-03-06 NOTE — PD
HPI


Chief Complaint:  Abdominal Pain


Time Seen by Provider:  21:23


Travel History


International Travel<30 days:  No


Contact w/Intl Traveler<30days:  No


Traveled to known affect area:  No





History of Present Illness


HPI


46-year-old female with history of pancreatitis, admitted to the hospital 2 

weeks ago for pancreatitis, here again with 7 out of 10 of her abdominal pain, 

nausea, vomiting, diarrhea, which she states is not significantly improving 

with hydrocodone and Zofran given to her for outpatient therapy.  She states 

that it had been controlling her pain for the past week but she has experienced 

a worsening.  She had talked to GI, Dr. Mai, and was told to go back on clear 

liquid diet and if that did not work today to come to the ER.  Patient states 

that it is not working.  She denies any fevers or any other symptoms.  She is 

status post cholecystectomy in the past.  Pain is more right upper quadrant and 

intermittent, got worse after eating today.





Modifying Factors: Worse with eating


Associated Signs & Symptoms: Abdominal pains, nausea, vomiting


Risk Factors: Pancreatitis history





PFSH


Past Medical History


Arthritis:  No


Asthma:  Yes


Anxiety:  Yes


Depression:  Yes


Heart Rhythm Problems:  No


Cancer:  No


Cardiac Catheterization:  No


Cardiovascular Problems:  Yes


High Cholesterol:  Yes (at one time, pt takes fish oil)


Diabetes:  Yes


Diminished Hearing:  No


Gastrointestinal Disorders:  Yes


GERD:  Yes


Genitourinary:  No


Hiatal Hernia:  No


Hypertension:  Yes


Immune Disorder:  No


Implanted Vascular Access Dvce:  No


Kidney Stones:  No


Musculoskeletal:  Yes


Neurologic:  No


Psychiatric:  Yes


Reproductive:  Yes (HYSTERECTOMY)


Respiratory:  Yes


Immunizations Current:  Yes


Migraines:  No


Pancreatitis:  Yes


Thyroid Disease:  No


Ulcer:  Yes


Pregnant?:  Not Pregnant


:  1


Para:  1





Past Surgical History


Abdominal Surgery:  Yes (ENDOMETRIAL TISSUE REMOVED )


Appendectomy:  Yes


Cardiac Surgery:  No


 Section:  Yes ()


Cholecystectomy:  Yes


Coronary Artery Bypass Graft:  No


Ear Surgery:  No


Endocrine Surgery:  No


Eye Surgery:  No


Genitourinary Surgery:  No


Gynecologic Surgery:  Yes (EXPLORATORY LAP , EXPL LAP  C CAUTERIZATION  

)


Hysterectomy:  Yes


Insulin Pump:  No


Joint Replacement:  No


Oral Surgery:  No


Pacemaker:  No


Thoracic Surgery:  No


Other Surgery:  Yes (EXP LAP, EXP ABD SURGERY)





Social History


Alcohol Use:  No


Tobacco Use:  No


Substance Use:  No





Allergies-Medications


(Allergen,Severity, Reaction):  


Coded Allergies:  


     Morphine (Verified  Allergy, Intermediate, HIVES, 3/6/17)


     Sulfa (Verified  Allergy, Intermediate, HIVES, 3/6/17)


     Pyridium (Verified  Adverse Reaction, Intermediate, VOMITING, 3/6/17)


Reported Meds & Prescriptions





Reported Meds & Active Scripts


Active


Creon (Amylase/Lipase/Protease) 24,000-76,000-120,000 Units Cap 2 Cap PO TID


Bentyl (Dicyclomine HCl) 10 Mg Cap 10 Mg PO TID PRN


Hydrocodone-Acetaminophen 5-325 mg Tab 1 Tab PO Q8HR PRN


Pantoprazole (Pantoprazole Sodium) 40 Mg Tab 40 Mg PO DAILY


Mobic (Meloxicam) 7.5 Mg Tab 7.5 Mg PO DAILY


Metformin (Metformin HCl) 1,000 Mg Tab 1,000 Mg PO BIDPC


     With meals


Reported


Vyvanse (Lisdexamfetamine Dimesylate) 30 Mg Cap 30 Mg PO DAILY


Cinnamon 500 Mg Cap 1,000 Mg PO DAILY


Gnp Melatonin (Melatonin) 3 Mg Tab 1 Tab PO DAILY


Keira (Keira (Zingiber Officinalis)) 500 Mg Cap 1 Cap PO DAILY


Glucosamine (Glucosamine Sulfate) 500 Mg Cap 500 Mg PO DAILY


Fish Oil (Omega-3 Fatty Acids) 500 Mg Cap 1 Cap PO DAILY


Trazodone (Trazodone HCl) 150 Mg Tab 150 Mg PO HS


Celexa (Citalopram Hydrobromide) 40 Mg Tab 60 Mg PO DAILY


Farxiga (Dapagliflozin) 10 Mg Tab 10 Mg PO DAILY


Flexeril (Cyclobenzaprine HCl) 10 Mg Tab 10 Mg PO TID


Hydrocodone-Acetaminophen 7.5-325 mg Tab 1 Tab PO DAILY PRN


Symbicort Inh (Budesonide/Formoterol Fumarate) 160-4.5 Mcg/Act Aero 1 Puff INH 

Q12HR


Breo Ellipta Inh (Fluticasone/Vilanterol) 100-25 Mcg/Act Inh 1 Puff INH DAILY


     Use daily at the same time.


Proventil Hfa 6.7 GM Inh (Albuterol Sulfate) 90 Mcg/Act Aer 1 Puff INH Q4H PRN


Metoprolol Tartrate 25 Mg Tab 25 Mg PO BID


Lisinopril 5 Mg Tab 5 Mg PO DAILY








Review of Systems


Except as stated in HPI:  all other systems reviewed are Neg





Physical Exam


Narrative


GENERAL: Well-nourished, well-developed middle age white female patient in mild 

distress.  Awake and oriented 3.


SKIN: Warm and dry.


HEAD: Normocephalic.


EYES: No scleral icterus. No injection or drainage. 


NECK: Supple, trachea midline. 


CARDIOVASCULAR: Regular rate and rhythm without murmurs, gallops, or rubs. 


RESPIRATORY: Breath sounds equal bilaterally. No accessory muscle use.


GASTROINTESTINAL: Abdomen soft, mild right upper quadrant tenderness and 

epigastric tenderness without guarding or rebound, nondistended. 


MUSCULOSKELETAL: No cyanosis, or edema. 


BACK: Nontender without obvious deformity. No CVA tenderness.





Data


Data


Last Documented VS





Vital Signs








  Date Time  Temp Pulse Resp B/P Pulse Ox O2 Delivery O2 Flow Rate FiO2


 


3/6/17 16:36 97.9 82 20 155/85 97 Room Air  








Orders





 Complete Blood Count With Diff (3/6/17 19:31)


Comprehensive Metabolic Panel (3/6/17 19:31)


Lipase (3/6/17 19:31)


Urinalysis - C+S If Indicated (3/6/17 19:31)


Sodium Chlor 0.9% 1000 Ml Inj (Ns 1000 M (3/6/17 21:45)


Hydromorphone Pf Inj (Dilaudid Pf Inj) (3/6/17 21:45)


Ondansetron Inj (Zofran Inj) (3/6/17 21:45)





Labs





 Laboratory Tests








Test 3/6/17





 20:00


 


White Blood Count 9.2 TH/MM3


 


Red Blood Count 4.85 MIL/MM3


 


Hemoglobin 14.8 GM/DL


 


Hematocrit 43.0 %


 


Mean Corpuscular Volume 88.7 FL


 


Mean Corpuscular Hemoglobin 30.6 PG


 


Mean Corpuscular Hemoglobin 34.5 %





Concent 


 


Red Cell Distribution Width 12.9 %


 


Platelet Count 227 TH/MM3


 


Mean Platelet Volume 8.7 FL


 


Neutrophils (%) (Auto) 45.4 %


 


Lymphocytes (%) (Auto) 45.3 %


 


Monocytes (%) (Auto) 6.4 %


 


Eosinophils (%) (Auto) 1.9 %


 


Basophils (%) (Auto) 1.0 %


 


Neutrophils # (Auto) 4.2 TH/MM3


 


Lymphocytes # (Auto) 4.2 TH/MM3


 


Monocytes # (Auto) 0.6 TH/MM3


 


Eosinophils # (Auto) 0.2 TH/MM3


 


Basophils # (Auto) 0.1 TH/MM3


 


CBC Comment DIFF FINAL 


 


Differential Comment  


 


Urine Color LIGHT-YELLOW 


 


Urine Turbidity CLEAR 


 


Urine pH 6.0 


 


Urine Specific Gravity 1.007 


 


Urine Protein NEG mg/dL


 


Urine Glucose (UA) 1000 mg/dL


 


Urine Ketones NEG mg/dL


 


Urine Occult Blood NEG 


 


Urine Nitrite NEG 


 


Urine Bilirubin NEG 


 


Urine Urobilinogen LESS THAN 2.0





 MG/DL


 


Urine Leukocyte Esterase NEG 


 


Urine RBC LESS THAN 1





 /hpf


 


Urine WBC 1 /hpf


 


Urine Squamous Epithelial 1 /hpf





Cells 


 


Microscopic Urinalysis Comment CULT NOT





 INDICATED


 


Sodium Level 137 MEQ/L


 


Potassium Level 3.8 MEQ/L


 


Chloride Level 101 MEQ/L


 


Carbon Dioxide Level 28.2 MEQ/L


 


Anion Gap 8 MEQ/L


 


Blood Urea Nitrogen 11 MG/DL


 


Creatinine 0.75 MG/DL


 


Estimat Glomerular Filtration 84 ML/MIN





Rate 


 


Random Glucose 102 MG/DL


 


Calcium Level 9.0 MG/DL


 


Total Bilirubin 0.5 MG/DL


 


Aspartate Amino Transf 26 U/L





(AST/SGOT) 


 


Alanine Aminotransferase 50 U/L





(ALT/SGPT) 


 


Alkaline Phosphatase 62 U/L


 


Total Protein 7.5 GM/DL


 


Albumin 4.4 GM/DL


 


Lipase 619 U/L











[facility]


Medical Decision Making


Medical Screen Exam Complete:  Yes


Emergency Medical Condition:  Yes


Medical Record Reviewed:  Yes


Interpretation(s)





Laboratory Tests








Test 3/6/17





 20:00


 


Lymphocytes (%) (Auto) 45.3 %





 (9.0-44.0)


 


Urine Glucose (UA) 1000 mg/dL





 (NEG)


 


Estimat Glomerular Filtration 84 ML/MIN (>89)





Rate 


 


Lipase 619 U/L





 ()








Differential Diagnosis


Pancreatitis versus gastritis versus gastroenteritis versus dehydration versus 

metabolic issues


Narrative Course


Lab work indicates worsening and pancreatitis.  At this point, my plan would be 

to admit the patient for observation and further evaluation.  Case was 

discussed with Dr. Rosen for admission.





Diagnosis





 Primary Impression:  


 Pancreatitis





Admitting Information


Admitting Physician Requests:  Admit








Marcel Quach MD Mar 6, 2017 21:29

## 2017-03-06 NOTE — HHI.HP
HPI


Service


[facility]


Primary Care Physician


Arya White M.D.


Admission Diagnosis


acute pancreatitis


Chief Complaint:  


abdominal pain today


Travel History


International Travel<30 Days:  No


Contact w/Intl Traveler <30 Da:  No


Traveled to Known Affected Are:  No


History of Present Illness





Patient is a 46-year-old female presenting to the emergency room for evaluation 

of increasing nausea, right upper quadrant abdominal pain.  Patient was 

diagnosed with chronic pancreatitis last week.  She states that she was 

recently discharged from the hospital, she was placed on a bland diet which she 

had been tolerating until today.  Patient has had some loose stools this week 

and had very little pain until today when she ate tilapia fish and shortly 

after that started haqving pain with nausea.  She called Dr. Mai who advised 

her to go back to a clear liquid diet.  Patient states she did that all day but 

continues to have 7 out of 10 right upper quadrant pain.  She denies any 

vomiting but reports diarrhea.  She denies any fever, chills, headache, chest 

pain, shortness of breath.  She denies any history of alcoholism.  Does report 

a history of diabetes, asthma, depression, hypertension.  In er had elevated 

lipase and admitted for pancreatitis.





Review of Systems


Gastrointestinal:  COMPLAINS OF: Abdominal pain, Nausea





Past Family Social History


Past Medical History


asthma,anxiety depression,diabetes,hypertension,GERD,hx ulcer pancreatitis 3 

times since 2013


Past Surgical History


gallbladder,hysterectomy,appendix,ex lap


Reported Medications





Creon (Amylase/Lipase/Protease) 24,000-76,000-120,000 Units Cap 2 Cap PO TID


Bentyl (Dicyclomine HCl) 10 Mg Cap 10 Mg PO TID PRN


Hydrocodone-Acetaminophen 5-325 mg Tab 1 Tab PO Q8HR PRN


Pantoprazole (Pantoprazole Sodium) 40 Mg Tab 40 Mg PO DAILY


Mobic (Meloxicam) 7.5 Mg Tab 7.5 Mg PO DAILY


Metformin (Metformin HCl) 1,000 Mg Tab 1,000 Mg PO BIDPC


     With meals


Reported


Vyvanse (Lisdexamfetamine Dimesylate) 30 Mg Cap 30 Mg PO DAILY


Cinnamon 500 Mg Cap 1,000 Mg PO DAILY


Gnp Melatonin (Melatonin) 3 Mg Tab 1 Tab PO DAILY


Keira (Keira (Zingiber Officinalis)) 500 Mg Cap 1 Cap PO DAILY


Glucosamine (Glucosamine Sulfate) 500 Mg Cap 500 Mg PO DAILY


Fish Oil (Omega-3 Fatty Acids) 500 Mg Cap 1 Cap PO DAILY


Trazodone (Trazodone HCl) 150 Mg Tab 150 Mg PO HS


Celexa (Citalopram Hydrobromide) 40 Mg Tab 60 Mg PO DAILY


Farxiga (Dapagliflozin) 10 Mg Tab 10 Mg PO DAILY


Flexeril (Cyclobenzaprine HCl) 10 Mg Tab 10 Mg PO TID


Hydrocodone-Acetaminophen 7.5-325 mg Tab 1 Tab PO DAILY PRN


Symbicort Inh (Budesonide/Formoterol Fumarate) 160-4.5 Mcg/Act Aero 1 Puff INH 

Q12HR


Breo Ellipta Inh (Fluticasone/Vilanterol) 100-25 Mcg/Act Inh 1 Puff INH DAILY


     Use daily at the same time.


Proventil Hfa 6.7 GM Inh (Albuterol Sulfate) 90 Mcg/Act Aer 1 Puff INH Q4H PRN


Metoprolol Tartrate 25 Mg Tab 25 Mg PO BID


Lisinopril 5 Mg Tab 5 Mg PO DAILY


Allergies:  


Coded Allergies:  


     Morphine (Verified  Allergy, Intermediate, HIVES, 3/6/17)


     Sulfa (Verified  Allergy, Intermediate, HIVES, 3/6/17)


     Pyridium (Verified  Adverse Reaction, Intermediate, VOMITING, 3/6/17)


Social History


NS,ND





Physical Exam


Vital Signs





 Vital Signs








  Date Time  Temp Pulse Resp B/P Pulse Ox O2 Delivery O2 Flow Rate FiO2


 


3/6/17 16:36 97.9 82 20 155/85 97 Room Air  








Physical Exam


GENERAL: This is a well-nourished, well-developed patient, in no apparent 

distress.


SKIN: No rashes, ecchymoses or lesions. Cool and dry.


HEAD: Atraumatic. Normocephalic. No temporal or scalp tenderness.


EYES: Pupils equal round and reactive. Extraocular motions intact. No scleral 

icterus. No injection or drainage. 


ENT: Nose without bleeding, purulent drainage or septal hematoma. Throat 

without erythema, tonsillar hypertrophy or exudate. Uvula midline. Airway 

patent.


NECK: Trachea midline. No JVD or lymphadenopathy. Supple, nontender, no 

meningeal signs.


CARDIOVASCULAR: Regular rate and rhythm without murmurs, gallops, or rubs. 


RESPIRATORY: Clear to auscultation. Breath sounds equal bilaterally. No wheezes

, rales, or rhonchi.  


GASTROINTESTINAL: Abdomen soft, slight rt upper quadrant-tender, nondistended. 

No hepato-splenomegaly, or palpable masses. No guarding.


MUSCULOSKELETAL: Extremities without clubbing, cyanosis, or edema. No joint 

tenderness, effusion, or edema noted. No calf tenderness. Negative Homans sign 

bilaterally.


NEUROLOGICAL: Awake and alert. Cranial nerves II through XII intact.  Motor and 

sensory grossly within normal limits. Five out of 5 muscle strength in all 

muscle groups.  Normal speech.


Laboratory





Laboratory Tests








Test 3/6/17





 20:00


 


White Blood Count 9.2 


 


Red Blood Count 4.85 


 


Hemoglobin 14.8 


 


Hematocrit 43.0 


 


Mean Corpuscular Volume 88.7 


 


Mean Corpuscular Hemoglobin 30.6 


 


Mean Corpuscular Hemoglobin 34.5 





Concent 


 


Red Cell Distribution Width 12.9 


 


Platelet Count 227 


 


Mean Platelet Volume 8.7 


 


Neutrophils (%) (Auto) 45.4 


 


Lymphocytes (%) (Auto) 45.3 


 


Monocytes (%) (Auto) 6.4 


 


Eosinophils (%) (Auto) 1.9 


 


Basophils (%) (Auto) 1.0 


 


Neutrophils # (Auto) 4.2 


 


Lymphocytes # (Auto) 4.2 


 


Monocytes # (Auto) 0.6 


 


Eosinophils # (Auto) 0.2 


 


Basophils # (Auto) 0.1 


 


CBC Comment DIFF FINAL 


 


Differential Comment  


 


Urine Color LIGHT-YELLOW 


 


Urine Turbidity CLEAR 


 


Urine pH 6.0 


 


Urine Specific Gravity 1.007 


 


Urine Protein NEG 


 


Urine Glucose (UA) 1000 


 


Urine Ketones NEG 


 


Urine Occult Blood NEG 


 


Urine Nitrite NEG 


 


Urine Bilirubin NEG 


 


Urine Urobilinogen LESS THAN 2.0 


 


Urine Leukocyte Esterase NEG 


 


Urine RBC LESS THAN 1 


 


Urine WBC 1 


 


Urine Squamous Epithelial 1 





Cells 


 


Microscopic Urinalysis Comment CULT NOT





 INDICATED


 


Sodium Level 137 


 


Potassium Level 3.8 


 


Chloride Level 101 


 


Carbon Dioxide Level 28.2 


 


Anion Gap 8 


 


Blood Urea Nitrogen 11 


 


Creatinine 0.75 


 


Estimat Glomerular Filtration 84 





Rate 


 


Random Glucose 102 


 


Calcium Level 9.0 


 


Total Bilirubin 0.5 


 


Aspartate Amino Transf 26 





(AST/SGOT) 


 


Alanine Aminotransferase 50 





(ALT/SGPT) 


 


Alkaline Phosphatase 62 


 


Total Protein 7.5 


 


Albumin 4.4 


 


Lipase 619 








Result Diagram:  


3/6/17 2000                                                                    

            3/6/17 2000





Course


in er received hydromorph with improvement in symptoms





Assessment and Plan


Problem List:  


(1) Pancreatitis


Status:  Acute


Plan:  recurrent IV fluid NPO for now consult GI pain meds follow labs 

according to patient has endoscopic ultrasound scheduled this month





(2) DM2 (diabetes mellitus, type 2)


Status:  Chronic


Plan:  sliding scale for now 





(3) Hypertension


Status:  Chronic


Plan:  continue home meds





Assessment and Plan


further plan as case progresses and GI evaluation continue other regular home 

medications


Code Status


full


Discussed Condition With


patient








Bertin Lee MD Mar 6, 2017 23:18

## 2017-03-06 NOTE — PD
HPI


Chief Complaint:  Abdominal Pain


Time Seen by Provider:  19:43


Travel History


International Travel<30 days:  No


Contact w/Intl Traveler<30days:  No


Traveled to known affect area:  No





History of Present Illness


HPI


Patient is a 46-year-old female presenting to the emergency room for evaluation 

of increasing nausea, right upper quadrant abdominal pain.  Patient was 

diagnosed with chronic pancreatitis.  She states that she was recently 

discharged from the hospital, she was placed on a bland diet which she had been 

tolerating until today.  She called Dr. Mai who advised her to go back to a 

clear liquid diet.  Patient states she did that all day but continues to have 7 

out of 10 right upper quadrant pain.  She denies any vomiting but reports 

diarrhea.  She denies any fever, chills, headache, chest pain, shortness of 

breath.  She denies any history of alcoholism.  Does report a history of 

diabetes, asthma, depression, hypertension.





PFSH


Past Medical History


Arthritis:  No


Asthma:  Yes


Anxiety:  Yes


Depression:  Yes


Heart Rhythm Problems:  No


Cancer:  No


Cardiac Catheterization:  No


Cardiovascular Problems:  Yes


High Cholesterol:  Yes (at one time, pt takes fish oil)


Diabetes:  Yes


Diminished Hearing:  No


Gastrointestinal Disorders:  Yes


GERD:  Yes


Genitourinary:  No


Hiatal Hernia:  No


Hypertension:  Yes


Immune Disorder:  No


Implanted Vascular Access Dvce:  No


Kidney Stones:  No


Musculoskeletal:  Yes


Neurologic:  No


Psychiatric:  Yes


Reproductive:  Yes (HYSTERECTOMY)


Respiratory:  Yes


Immunizations Current:  Yes


Migraines:  No


Pancreatitis:  Yes


Thyroid Disease:  No


Ulcer:  Yes


Pregnant?:  Not Pregnant


:  1


Para:  1





Past Surgical History


Abdominal Surgery:  Yes (ENDOMETRIAL TISSUE REMOVED )


Appendectomy:  Yes


Cardiac Surgery:  No


 Section:  Yes ()


Cholecystectomy:  Yes


Coronary Artery Bypass Graft:  No


Ear Surgery:  No


Endocrine Surgery:  No


Eye Surgery:  No


Genitourinary Surgery:  No


Gynecologic Surgery:  Yes (EXPLORATORY LAP , EXPL LAP  C CAUTERIZATION  

)


Hysterectomy:  Yes


Insulin Pump:  No


Joint Replacement:  No


Oral Surgery:  No


Pacemaker:  No


Thoracic Surgery:  No


Other Surgery:  Yes (EXP LAP, EXP ABD SURGERY)





Social History


Alcohol Use:  No


Tobacco Use:  No


Substance Use:  No





Allergies-Medications


(Allergen,Severity, Reaction):  


Coded Allergies:  


     Morphine (Verified  Allergy, Intermediate, HIVES, 3/6/17)


     Sulfa (Verified  Allergy, Intermediate, HIVES, 3/6/17)


     Pyridium (Verified  Adverse Reaction, Intermediate, VOMITING, 3/6/17)


Reported Meds & Prescriptions





Reported Meds & Active Scripts


Active


Creon (Amylase/Lipase/Protease) 24,000-76,000-120,000 Units Cap 2 Cap PO TID


Bentyl (Dicyclomine HCl) 10 Mg Cap 10 Mg PO TID PRN


Hydrocodone-Acetaminophen 5-325 mg Tab 1 Tab PO Q8HR PRN


Pantoprazole (Pantoprazole Sodium) 40 Mg Tab 40 Mg PO DAILY


Mobic (Meloxicam) 7.5 Mg Tab 7.5 Mg PO DAILY


Metformin (Metformin HCl) 1,000 Mg Tab 1,000 Mg PO BIDPC


     With meals


Reported


Vyvanse (Lisdexamfetamine Dimesylate) 30 Mg Cap 30 Mg PO DAILY


Cinnamon 500 Mg Cap 1,000 Mg PO DAILY


Gnp Melatonin (Melatonin) 3 Mg Tab 1 Tab PO DAILY


Keira (Keira (Zingiber Officinalis)) 500 Mg Cap 1 Cap PO DAILY


Glucosamine (Glucosamine Sulfate) 500 Mg Cap 500 Mg PO DAILY


Fish Oil (Omega-3 Fatty Acids) 500 Mg Cap 1 Cap PO DAILY


Trazodone (Trazodone HCl) 150 Mg Tab 150 Mg PO HS


Celexa (Citalopram Hydrobromide) 40 Mg Tab 60 Mg PO DAILY


Farxiga (Dapagliflozin) 10 Mg Tab 10 Mg PO DAILY


Flexeril (Cyclobenzaprine HCl) 10 Mg Tab 10 Mg PO TID


Hydrocodone-Acetaminophen 7.5-325 mg Tab 1 Tab PO DAILY PRN


Symbicort Inh (Budesonide/Formoterol Fumarate) 160-4.5 Mcg/Act Aero 1 Puff INH 

Q12HR


Breo Ellipta Inh (Fluticasone/Vilanterol) 100-25 Mcg/Act Inh 1 Puff INH DAILY


     Use daily at the same time.


Proventil Hfa 6.7 GM Inh (Albuterol Sulfate) 90 Mcg/Act Aer 1 Puff INH Q4H PRN


Metoprolol Tartrate 25 Mg Tab 25 Mg PO BID


Lisinopril 5 Mg Tab 5 Mg PO DAILY








Review of Systems


Except as stated in HPI:  all other systems reviewed are Neg


General / Constitutional:  No: Fever


Cardiovascular:  No: Chest Pain or Discomfort


Respiratory:  No: Shortness of Breath


Gastrointestinal:  Positive: Nausea, Diarrhea, Abdominal Pain,  No: Vomiting, 

Loss of Appetite





Physical Exam


Narrative


GENERAL: Overweight, well-developed, alert  female.  Resting 

comfortably in no acute distress.


SKIN: Warm and dry.


HEAD: Atraumatic. Normocephalic. 


EYES: Pupils equal and round. No scleral icterus. No injection or drainage. 


ENT: No nasal bleeding or discharge.  Mucous membranes pink and moist.


NECK: Trachea midline. No JVD. 


CARDIOVASCULAR: Regular rate and rhythm.  No murmur appreciated.


RESPIRATORY: No accessory muscle use. Clear to auscultation. Breath sounds 

equal bilaterally. 


GASTROINTESTINAL: Abdomen soft, mildly tender to palpation in right upper 

quadrant, nondistended. Hepatic and splenic margins not palpable.  No rebound, 

no guarding.  Positive bowel sounds.


MUSCULOSKELETAL: No obvious deformities. No clubbing.  No cyanosis.  No edema. 


NEUROLOGICAL: Awake and alert. No obvious cranial nerve deficits.  Motor 

grossly within normal limits. Normal speech.


PSYCHIATRIC: Appropriate mood and affect; insight and judgment normal.





Data


Data


Last Documented VS





Vital Signs








  Date Time  Temp Pulse Resp B/P Pulse Ox O2 Delivery O2 Flow Rate FiO2


 


3/6/17 16:36 97.9 82 20 155/85 97 Room Air  








Orders





 Complete Blood Count With Diff (3/6/17 19:31)


Comprehensive Metabolic Panel (3/6/17 19:31)


Lipase (3/6/17 19:31)


Urinalysis - C+S If Indicated (3/6/17 19:31)


Sodium Chlor 0.9% 1000 Ml Inj (Ns 1000 M (3/6/17 21:45)


Hydromorphone Pf Inj (Dilaudid Pf Inj) (3/6/17 21:45)


Ondansetron Inj (Zofran Inj) (3/6/17 21:45)


Admit Order (Ed Use Only) (3/6/17 21:36)





Labs








 Laboratory Tests








Test 3/6/17





 20:00


 


White Blood Count 9.2 TH/MM3


 


Red Blood Count 4.85 MIL/MM3


 


Hemoglobin 14.8 GM/DL


 


Hematocrit 43.0 %


 


Mean Corpuscular Volume 88.7 FL


 


Mean Corpuscular Hemoglobin 30.6 PG


 


Mean Corpuscular Hemoglobin 34.5 %





Concent 


 


Red Cell Distribution Width 12.9 %


 


Platelet Count 227 TH/MM3


 


Mean Platelet Volume 8.7 FL


 


Neutrophils (%) (Auto) 45.4 %


 


Lymphocytes (%) (Auto) 45.3 %


 


Monocytes (%) (Auto) 6.4 %


 


Eosinophils (%) (Auto) 1.9 %


 


Basophils (%) (Auto) 1.0 %


 


Neutrophils # (Auto) 4.2 TH/MM3


 


Lymphocytes # (Auto) 4.2 TH/MM3


 


Monocytes # (Auto) 0.6 TH/MM3


 


Eosinophils # (Auto) 0.2 TH/MM3


 


Basophils # (Auto) 0.1 TH/MM3


 


CBC Comment DIFF FINAL 


 


Differential Comment  


 


Urine Color LIGHT-YELLOW 


 


Urine Turbidity CLEAR 


 


Urine pH 6.0 


 


Urine Specific Gravity 1.007 


 


Urine Protein NEG mg/dL


 


Urine Glucose (UA) 1000 mg/dL


 


Urine Ketones NEG mg/dL


 


Urine Occult Blood NEG 


 


Urine Nitrite NEG 


 


Urine Bilirubin NEG 


 


Urine Urobilinogen LESS THAN 2.0





 MG/DL


 


Urine Leukocyte Esterase NEG 


 


Urine RBC LESS THAN 1





 /hpf


 


Urine WBC 1 /hpf


 


Urine Squamous Epithelial 1 /hpf





Cells 


 


Microscopic Urinalysis Comment CULT NOT





 INDICATED


 


Sodium Level 137 MEQ/L


 


Potassium Level 3.8 MEQ/L


 


Chloride Level 101 MEQ/L


 


Carbon Dioxide Level 28.2 MEQ/L


 


Anion Gap 8 MEQ/L


 


Blood Urea Nitrogen 11 MG/DL


 


Creatinine 0.75 MG/DL


 


Estimat Glomerular Filtration 84 ML/MIN





Rate 


 


Random Glucose 102 MG/DL


 


Calcium Level 9.0 MG/DL


 


Total Bilirubin 0.5 MG/DL


 


Aspartate Amino Transf 26 U/L





(AST/SGOT) 


 


Alanine Aminotransferase 50 U/L





(ALT/SGPT) 


 


Alkaline Phosphatase 62 U/L


 


Total Protein 7.5 GM/DL


 


Albumin 4.4 GM/DL


 


Lipase 619 U/L














MDM


Medical Decision Making


Medical Screen Exam Complete:  Yes


Emergency Medical Condition:  Yes


Medical Record Reviewed:  Yes


Interpretation(s)





Vital Signs








  Date Time  Temp Pulse Resp B/P Pulse Ox O2 Delivery O2 Flow Rate FiO2


 


3/6/17 16:36 97.9 82 20 155/85 97 Room Air  








Differential Diagnosis


Chronic pancreatitis versus gastritis versus gastroenteritis versus other


Narrative Course


Patient is a 46-year-old female presenting to emergency for evaluation of right 

upper quadrant abdominal pain, nausea, diarrhea.  Patient was diagnosed with 

chronic pancreatitis, she was discharged from the hospital on 17.


Her initial lipase was elevated greater than 2200, by discharge it was 468.  


CT scan of the abdomen and pelvis performed on  which showed a fatty liver.


HIDA scan showed hepatomegaly without biliary obstruction and some degree of 

bile gastric reflux


MRCP was unremarkable.


Workup initiated in triage, care patient will be transferred to provider with a 

medical bed is available.








Armida Lai [facility] Mar 6, 2017 19:49

## 2017-03-07 VITALS
HEART RATE: 83 BPM | DIASTOLIC BLOOD PRESSURE: 67 MMHG | SYSTOLIC BLOOD PRESSURE: 122 MMHG | TEMPERATURE: 98.2 F | OXYGEN SATURATION: 95 % | RESPIRATION RATE: 18 BRPM

## 2017-03-07 VITALS
HEART RATE: 72 BPM | SYSTOLIC BLOOD PRESSURE: 148 MMHG | TEMPERATURE: 97.8 F | OXYGEN SATURATION: 95 % | DIASTOLIC BLOOD PRESSURE: 72 MMHG | RESPIRATION RATE: 18 BRPM

## 2017-03-07 VITALS
TEMPERATURE: 97.6 F | HEART RATE: 63 BPM | OXYGEN SATURATION: 94 % | RESPIRATION RATE: 18 BRPM | SYSTOLIC BLOOD PRESSURE: 100 MMHG | DIASTOLIC BLOOD PRESSURE: 54 MMHG

## 2017-03-07 VITALS
TEMPERATURE: 97.9 F | RESPIRATION RATE: 18 BRPM | DIASTOLIC BLOOD PRESSURE: 82 MMHG | OXYGEN SATURATION: 98 % | SYSTOLIC BLOOD PRESSURE: 144 MMHG | HEART RATE: 71 BPM

## 2017-03-07 VITALS
DIASTOLIC BLOOD PRESSURE: 68 MMHG | RESPIRATION RATE: 18 BRPM | HEART RATE: 83 BPM | TEMPERATURE: 97.9 F | SYSTOLIC BLOOD PRESSURE: 141 MMHG | OXYGEN SATURATION: 96 %

## 2017-03-07 VITALS — DIASTOLIC BLOOD PRESSURE: 55 MMHG | SYSTOLIC BLOOD PRESSURE: 109 MMHG

## 2017-03-07 LAB
ALP SERPL-CCNC: 77 U/L (ref 45–117)
ALT SERPL-CCNC: 228 U/L (ref 10–53)
ANION GAP SERPL CALC-SCNC: 6 MEQ/L (ref 5–15)
AST SERPL-CCNC: 261 U/L (ref 15–37)
BASOPHILS # BLD AUTO: 0 TH/MM3 (ref 0–0.2)
BASOPHILS NFR BLD: 0.6 % (ref 0–2)
BILIRUB SERPL-MCNC: 0.6 MG/DL (ref 0.2–1)
BUN SERPL-MCNC: 10 MG/DL (ref 7–18)
CHLORIDE SERPL-SCNC: 105 MEQ/L (ref 98–107)
EOSINOPHIL # BLD: 0.1 TH/MM3 (ref 0–0.4)
EOSINOPHIL NFR BLD: 1.8 % (ref 0–4)
ERYTHROCYTE [DISTWIDTH] IN BLOOD BY AUTOMATED COUNT: 12.9 % (ref 11.6–17.2)
GFR SERPLBLD BASED ON 1.73 SQ M-ARVRAT: 91 ML/MIN (ref 89–?)
HCO3 BLD-SCNC: 29.6 MEQ/L (ref 21–32)
HCT VFR BLD CALC: 39 % (ref 35–46)
HEMO FLAGS: (no result)
LYMPHOCYTES # BLD AUTO: 2.4 TH/MM3 (ref 1–4.8)
LYMPHOCYTES NFR BLD AUTO: 44.6 % (ref 9–44)
MCH RBC QN AUTO: 30.8 PG (ref 27–34)
MCHC RBC AUTO-ENTMCNC: 34.5 % (ref 32–36)
MCV RBC AUTO: 89.3 FL (ref 80–100)
MONOCYTES NFR BLD: 7.7 % (ref 0–8)
NEUTROPHILS # BLD AUTO: 2.4 TH/MM3 (ref 1.8–7.7)
NEUTROPHILS NFR BLD AUTO: 45.3 % (ref 16–70)
PLATELET # BLD: 183 TH/MM3 (ref 150–450)
POTASSIUM SERPL-SCNC: 3.7 MEQ/L (ref 3.5–5.1)
RBC # BLD AUTO: 4.37 MIL/MM3 (ref 4–5.3)
SODIUM SERPL-SCNC: 141 MEQ/L (ref 136–145)
WBC # BLD AUTO: 5.4 TH/MM3 (ref 4–11)

## 2017-03-07 RX ADMIN — PANCRELIPASE SCH CAP: 24000; 76000; 120000 CAPSULE, DELAYED RELEASE PELLETS ORAL at 12:47

## 2017-03-07 RX ADMIN — THIAMINE HYDROCHLORIDE SCH MLS/HR: 100 INJECTION, SOLUTION INTRAMUSCULAR; INTRAVENOUS at 01:43

## 2017-03-07 RX ADMIN — THIAMINE HYDROCHLORIDE SCH MLS/HR: 100 INJECTION, SOLUTION INTRAMUSCULAR; INTRAVENOUS at 23:48

## 2017-03-07 RX ADMIN — INSULIN ASPART SCH: 100 INJECTION, SOLUTION INTRAVENOUS; SUBCUTANEOUS at 21:00

## 2017-03-07 RX ADMIN — INSULIN ASPART SCH: 100 INJECTION, SOLUTION INTRAVENOUS; SUBCUTANEOUS at 07:00

## 2017-03-07 RX ADMIN — HYDROMORPHONE HYDROCHLORIDE PRN MG: 1 INJECTION, SOLUTION INTRAMUSCULAR; INTRAVENOUS; SUBCUTANEOUS at 13:15

## 2017-03-07 RX ADMIN — LISINOPRIL SCH MG: 5 TABLET ORAL at 09:00

## 2017-03-07 RX ADMIN — BUDESONIDE AND FORMOTEROL FUMARATE DIHYDRATE SCH PUFF: 160; 4.5 AEROSOL RESPIRATORY (INHALATION) at 09:00

## 2017-03-07 RX ADMIN — PANTOPRAZOLE SODIUM SCH MG: 40 INJECTION, POWDER, FOR SOLUTION INTRAVENOUS at 01:43

## 2017-03-07 RX ADMIN — SODIUM CHLORIDE, PRESERVATIVE FREE SCH ML: 5 INJECTION INTRAVENOUS at 09:07

## 2017-03-07 RX ADMIN — PANCRELIPASE SCH CAP: 24000; 76000; 120000 CAPSULE, DELAYED RELEASE PELLETS ORAL at 18:36

## 2017-03-07 RX ADMIN — INSULIN ASPART SCH: 100 INJECTION, SOLUTION INTRAVENOUS; SUBCUTANEOUS at 11:00

## 2017-03-07 RX ADMIN — FLUTICASONE FUROATE AND VILANTEROL TRIFENATATE SCH PUFF: 100; 25 POWDER RESPIRATORY (INHALATION) at 09:00

## 2017-03-07 RX ADMIN — HYDROMORPHONE HYDROCHLORIDE PRN MG: 1 INJECTION, SOLUTION INTRAMUSCULAR; INTRAVENOUS; SUBCUTANEOUS at 19:22

## 2017-03-07 RX ADMIN — THIAMINE HYDROCHLORIDE SCH MLS/HR: 100 INJECTION, SOLUTION INTRAMUSCULAR; INTRAVENOUS at 17:45

## 2017-03-07 RX ADMIN — PANTOPRAZOLE SODIUM SCH MG: 40 INJECTION, POWDER, FOR SOLUTION INTRAVENOUS at 22:26

## 2017-03-07 RX ADMIN — SODIUM CHLORIDE, PRESERVATIVE FREE SCH ML: 5 INJECTION INTRAVENOUS at 21:00

## 2017-03-07 RX ADMIN — METOPROLOL TARTRATE SCH MG: 25 TABLET, FILM COATED ORAL at 22:26

## 2017-03-07 RX ADMIN — CYCLOBENZAPRINE HYDROCHLORIDE SCH MG: 10 TABLET, FILM COATED ORAL at 18:36

## 2017-03-07 RX ADMIN — PANCRELIPASE SCH CAP: 24000; 76000; 120000 CAPSULE, DELAYED RELEASE PELLETS ORAL at 09:00

## 2017-03-07 RX ADMIN — METOPROLOL TARTRATE SCH MG: 25 TABLET, FILM COATED ORAL at 09:08

## 2017-03-07 RX ADMIN — ONDANSETRON PRN MG: 2 INJECTION, SOLUTION INTRAMUSCULAR; INTRAVENOUS at 11:13

## 2017-03-07 RX ADMIN — CITALOPRAM SCH MG: 40 TABLET, FILM COATED ORAL at 22:26

## 2017-03-07 RX ADMIN — HYDROMORPHONE HYDROCHLORIDE PRN MG: 1 INJECTION, SOLUTION INTRAMUSCULAR; INTRAVENOUS; SUBCUTANEOUS at 23:03

## 2017-03-07 RX ADMIN — CYCLOBENZAPRINE HYDROCHLORIDE SCH MG: 10 TABLET, FILM COATED ORAL at 13:14

## 2017-03-07 RX ADMIN — BUDESONIDE AND FORMOTEROL FUMARATE DIHYDRATE SCH PUFF: 160; 4.5 AEROSOL RESPIRATORY (INHALATION) at 21:00

## 2017-03-07 RX ADMIN — INSULIN ASPART SCH: 100 INJECTION, SOLUTION INTRAVENOUS; SUBCUTANEOUS at 16:42

## 2017-03-07 RX ADMIN — CYCLOBENZAPRINE HYDROCHLORIDE SCH MG: 10 TABLET, FILM COATED ORAL at 09:08

## 2017-03-07 RX ADMIN — HYDROMORPHONE HYDROCHLORIDE PRN MG: 1 INJECTION, SOLUTION INTRAMUSCULAR; INTRAVENOUS; SUBCUTANEOUS at 01:46

## 2017-03-07 NOTE — HHI.PR
Subjective


Remarks


Pt reports that her abd pain is improving


No further nausea/vomiting.





Objective


Vitals





 Vital Signs








  Date Time  Temp Pulse Resp B/P Pulse Ox O2 Delivery O2 Flow Rate FiO2


 


3/7/17 08:04 97.6 63 18 100/54 94   


 


3/7/17 02:56   18     


 


3/7/17 00:43 97.9 83 18 141/68 96   


 


3/7/17 00:20  75 16 109/55 98   


 


3/6/17 16:36 97.9 82 20 155/85 97 Room Air  








Result Diagram:  


3/7/17 0715                                                                    

            3/7/17 0715





Other Results





 Laboratory Tests








Test 3/6/17 3/7/17





 20:00 07:15


 


White Blood Count 9.2 TH/MM3 5.4 TH/MM3


 


Red Blood Count 4.85 MIL/MM3 4.37 MIL/MM3


 


Hemoglobin 14.8 GM/DL 13.4 GM/DL


 


Hematocrit 43.0 % 39.0 %


 


Mean Corpuscular Volume 88.7 FL 89.3 FL


 


Mean Corpuscular Hemoglobin 30.6 PG 30.8 PG


 


Mean Corpuscular Hemoglobin 34.5 % 34.5 %





Concent  


 


Red Cell Distribution Width 12.9 % 12.9 %


 


Platelet Count 227 TH/MM3 183 TH/MM3


 


Mean Platelet Volume 8.7 FL 9.2 FL


 


Neutrophils (%) (Auto) 45.4 % 45.3 %


 


Lymphocytes (%) (Auto) 45.3 % 44.6 %


 


Monocytes (%) (Auto) 6.4 % 7.7 %


 


Eosinophils (%) (Auto) 1.9 % 1.8 %


 


Basophils (%) (Auto) 1.0 % 0.6 %


 


Neutrophils # (Auto) 4.2 TH/MM3 2.4 TH/MM3


 


Lymphocytes # (Auto) 4.2 TH/MM3 2.4 TH/MM3


 


Monocytes # (Auto) 0.6 TH/MM3 0.4 TH/MM3


 


Eosinophils # (Auto) 0.2 TH/MM3 0.1 TH/MM3


 


Basophils # (Auto) 0.1 TH/MM3 0.0 TH/MM3


 


CBC Comment DIFF FINAL  DIFF FINAL 


 


Differential Comment    


 


Urine Color LIGHT-YELLOW  


 


Urine Turbidity CLEAR  


 


Urine pH 6.0  


 


Urine Specific Gravity 1.007  


 


Urine Protein NEG mg/dL 


 


Urine Glucose (UA) 1000 mg/dL 


 


Urine Ketones NEG mg/dL 


 


Urine Occult Blood NEG  


 


Urine Nitrite NEG  


 


Urine Bilirubin NEG  


 


Urine Urobilinogen LESS THAN 2.0 





 MG/DL 


 


Urine Leukocyte Esterase NEG  


 


Urine RBC LESS THAN 1 





 /hpf 


 


Urine WBC 1 /hpf 


 


Urine Squamous Epithelial 1 /hpf 





Cells  


 


Microscopic Urinalysis Comment CULT NOT 





 INDICATED 


 


Sodium Level 137 MEQ/L 141 MEQ/L


 


Potassium Level 3.8 MEQ/L 3.7 MEQ/L


 


Chloride Level 101 MEQ/L 105 MEQ/L


 


Carbon Dioxide Level 28.2 MEQ/L 29.6 MEQ/L


 


Anion Gap 8 MEQ/L 6 MEQ/L


 


Blood Urea Nitrogen 11 MG/DL 10 MG/DL


 


Creatinine 0.75 MG/DL 0.70 MG/DL


 


Estimat Glomerular Filtration 84 ML/MIN 91 ML/MIN





Rate  


 


Random Glucose 102 MG/ MG/DL


 


Calcium Level 9.0 MG/DL 8.2 MG/DL


 


Total Bilirubin 0.5 MG/DL 0.6 MG/DL


 


Aspartate Amino Transf 26 U/L 261 U/L





(AST/SGOT)  


 


Alanine Aminotransferase 50 U/L 228 U/L





(ALT/SGPT)  


 


Alkaline Phosphatase 62 U/L 77 U/L


 


Total Protein 7.5 GM/DL 6.3 GM/DL


 


Albumin 4.4 GM/DL 3.6 GM/DL


 


Lipase 619 U/L 325 U/L








Objective Remarks


General: NAD, AAOx3


Chest: CTA


Cardiac: Regular


Abd: +BS, soft nontender


Ext: No edema





A/P


Problem List:  


(1) Pancreatitis


Status:  Acute


Plan:  


- Pt has had recurrent pancreatitis of unclear etiology


- She has had about 5 episodes of pancreatitis diagnosed since 4/2013. She 

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


 prior to the onset of her symptoms. She denies any alcohol use at all. Pt had 

her GB removed previously. 


- Pt is following with GI outpt and is planned for EUS on 3/24/17


- Pt is currently NPO


- IV fluid 


- GI is consulted 


- Pain meds PRN


- Monitor labs


- Supportive Care





(2) DM2 (diabetes mellitus, type 2)


Status:  Chronic


Plan:  


- NovoLog SSI


- Accu checks 





(3) Hypertension


Status:  Chronic


Plan:  


- Continue home meds





Assessment and Plan


Patient examined.


Assessment and plan formulated with Edie Rodriguez PA-C.


I agree with the above.


acute pancreatitis. recurrent. unknown etiology.follows with GI


and EUS was planned. GI consulted. ivf. advance diet per GI.


lft elevated. recheck.








Edie Rodriguez Mar 7, 2017 10:07


Julius Jurado MD Mar 7, 2017 11:38

## 2017-03-07 NOTE — PD.CONS
HPI


History of Present Illness


This is a 46 year old lady with past medical history of chronic pancreatitis, 

diabetes, HTN, s/p cholecystectomy 2013.  Presented to ED with severe constant 

RUQ pain 7 on 0-10 scale started on Sunday after eating [place] and sweet 

potatoes.  She has only been on clear liquids without relief and this is 

associated with nausea.  She was hospitalized 2 weeks ago for pancreatitis, at 

which time she had extensive work up including HIDA scan which indicated 

hepatomegaly without evidence biliary obstruction and some degree of bile 

gastric reflux.  CTA showed fatty liver. MRCP showed fatty liver as well and 

was otherwise unremarkable.  Immunology and serology and IGG4 negative.  She 

was seen in the office yesterday and overall had been feeling better since 

taking Creon, and EUS scheduled for 3/24, went to work, and then had worsening 

of RUQ pain.  She said she took 5-6 hydrocodone yesterday and still had pain 9/

10, took Zofran and didn't help the nausea.  She says her pain is finally under 

control after administration Dilaudid.  She had EGD and colonoscopy in 2014 and 

had ulcers at that time. She used to use NSAIDs but stopped after told she had 

gastric ulcer,  She admits to recent daily use of meloxicam since last summer 

not aware of side effects.   Labs revealed elevated lipase which has normalized 

since, LFTs have been elevated, seem to be worse than her last visit.  She 

denies ETOH.  She reports some fever and chills, no vomiting, hematochezia, she 

has intermittent diarrhea.





PFSH


Past Medical History


asthma,anxiety depression,diabetes,hypertension,GERD,hx ulcer pancreatitis 3 

times since 2013


Past Surgical History


gallbladder,hysterectomy,appendix,ex lap


Coded Allergies:  


     Morphine (Verified  Allergy, Intermediate, HIVES, 3/6/17)


     Sulfa (Verified  Allergy, Intermediate, HIVES, 3/6/17)


     Pyridium (Verified  Adverse Reaction, Intermediate, VOMITING, 3/6/17)


Medications





 Current Medications








 Medications


  (Trade)  Dose


 Ordered  Sig/Lindsay


 Route  Start Time


 Stop Time Status Last Admin


 


  (Proair Hfa Inh)  1 puff  Q4H  PRN


 INH  3/6/17 22:00


     


 


 


  (Symbicort


 160-4.5 Inh)  1 puff  Q12HR


 INH  3/7/17 09:00


     


 


 


  (Flexeril)  10 mg  TID


 PO  3/7/17 09:00


    3/7/17 13:14


 


 


  (Bentyl)  10 mg  TID  PRN


 PO  3/6/17 22:00


     


 


 


  (Breo Ellipta


 100-25 Inh)  1 puff  DAILY


 INH  3/7/17 09:00


     


 


 


  (Prinivil)  5 mg  DAILY


 PO  3/7/17 09:00


     


 


 


  (Lopressor)  25 mg  BID


 PO  3/7/17 09:00


    3/7/17 09:08


 


 


  (Creon 24-)  2 cap  TID


 PO  3/7/17 09:00


     


 


 


 Pantoprazole


 Sodium 40 mg  40 mg  Q24H


 IV PUSH  3/6/17 22:00


    3/7/17 01:43


 


 


  (1/2 NS 1000 ml


 Inj)  1,000 ml @ 


 75 mls/hr  [id number]


 IV  3/6/17 21:56


    3/7/17 01:43


 


 


  (NS Flush)  2 ml  UNSCH  PRN


 FLUSH  3/6/17 22:00


     


 


 


  (NS Flush)  2 ml  BID


 FLUSH  3/7/17 09:00


    3/7/17 09:07


 


 


  (Zofran Inj)  4 mg  Q6H  PRN


 IVP  3/6/17 22:00


    3/7/17 11:13


 


 


  (Dilaudid Pf Inj)  0.5 mg  Q3H  PRN


 IV  3/6/17 22:00


    3/7/17 13:15


 


 


  (Narcan Inj)  0.4 mg  UNSCH  PRN


 IV  3/6/17 22:00


     


 


 


 Patient Own


 Medication  PT OWN MED:


 LISDEXAMFETAMINE


 DIMESYLATE 3...  DAILY


 PO  3/7/17 09:00


   Hold  


 


 


  (Pill Splitter)  1 ea  UNSCH  PRN


 OTHER  3/6/17 22:15


     


 


 


  (Mylicon Chew)  80 mg  Q8HR  PRN


 CHEW  3/6/17 23:30


     


 


 


  (CeleXA)  60 mg  HS


 PO  3/7/17 21:00


     


 


 


  (Desyrel)  150 mg  HS


 PO  3/7/17 21:00


     


 








Family History


no family hx pancreatitis, colon ca, gastric ca


Social History


Denies alcohol


Denies smoking


Denies illicit drugs





Review of Systems


Constitutional:  COMPLAINS OF: Fever, Chills


Endocrine:  DENIES: Polyuria


Eyes:  DENIES: Double Vision


Ears, nose, mouth, throat:  DENIES: Hoarseness


Respiratory:  DENIES: Shortness of breath


Cardiovascular:  DENIES: Lower Extremity Edema


Gastrointestinal:  COMPLAINS OF: Abdominal pain, Diarrhea, Nausea,  DENIES: 

Black stools, Bloody stools, Constipation, Vomiting, Difficulty Swallowing, 

Anorexia, Odynophagia, Swelling of Abdomen, Heartburn, Hematemesis


Genitourinary:  DENIES: Hematuria


Musculoskeletal:  DENIES: Back pain


Integumentary:  DENIES: Jaundice


Hematologic/lymphatic:  DENIES: Bruising


Immunologic/allergic:  DENIES: Eczema


Neurologic:  DENIES: Abnormal gait


Psychiatric:  COMPLAINS OF: Anxiety





GI Exam


Vitals I&O





 Vital Signs








  Date Time  Temp Pulse Resp B/P Pulse Ox O2 Delivery O2 Flow Rate FiO2


 


3/7/17 12:05 97.8 72 18 148/72 95   


 


3/7/17 08:04 97.6 63 18 100/54 94   


 


3/7/17 02:56   18     


 


3/7/17 00:43 97.9 83 18 141/68 96   


 


3/7/17 00:20  75 16 109/55 98   


 


3/6/17 16:36 97.9 82 20 155/85 97 Room Air  








Laboratory











Test 3/6/17 3/7/17





 20:00 07:15


 


White Blood Count 9.2 TH/MM3 5.4 TH/MM3


 


Red Blood Count 4.85 MIL/MM3 4.37 MIL/MM3


 


Hemoglobin 14.8 GM/DL 13.4 GM/DL


 


Hematocrit 43.0 % 39.0 %


 


Mean Corpuscular Volume 88.7 FL 89.3 FL


 


Mean Corpuscular Hemoglobin 30.6 PG 30.8 PG


 


Mean Corpuscular Hemoglobin 34.5 % 34.5 %





Concent  


 


Red Cell Distribution Width 12.9 % 12.9 %


 


Platelet Count 227 TH/MM3 183 TH/MM3


 


Mean Platelet Volume 8.7 FL 9.2 FL


 


Neutrophils (%) (Auto) 45.4 % 45.3 %


 


Lymphocytes (%) (Auto) 45.3 % 44.6 %


 


Monocytes (%) (Auto) 6.4 % 7.7 %


 


Eosinophils (%) (Auto) 1.9 % 1.8 %


 


Basophils (%) (Auto) 1.0 % 0.6 %


 


Neutrophils # (Auto) 4.2 TH/MM3 2.4 TH/MM3


 


Lymphocytes # (Auto) 4.2 TH/MM3 2.4 TH/MM3


 


Monocytes # (Auto) 0.6 TH/MM3 0.4 TH/MM3


 


Eosinophils # (Auto) 0.2 TH/MM3 0.1 TH/MM3


 


Basophils # (Auto) 0.1 TH/MM3 0.0 TH/MM3


 


CBC Comment DIFF FINAL  DIFF FINAL 


 


Differential Comment    


 


Urine Color LIGHT-YELLOW  


 


Urine Turbidity CLEAR  


 


Urine pH 6.0  


 


Urine Specific Gravity 1.007  


 


Urine Protein NEG mg/dL 


 


Urine Glucose (UA) 1000 mg/dL 


 


Urine Ketones NEG mg/dL 


 


Urine Occult Blood NEG  


 


Urine Nitrite NEG  


 


Urine Bilirubin NEG  


 


Urine Urobilinogen LESS THAN 2.0 





 MG/DL 


 


Urine Leukocyte Esterase NEG  


 


Urine RBC LESS THAN 1 





 /hpf 


 


Urine WBC 1 /hpf 


 


Urine Squamous Epithelial 1 /hpf 





Cells  


 


Microscopic Urinalysis Comment CULT NOT 





 INDICATED 


 


Sodium Level 137 MEQ/L 141 MEQ/L


 


Potassium Level 3.8 MEQ/L 3.7 MEQ/L


 


Chloride Level 101 MEQ/L 105 MEQ/L


 


Carbon Dioxide Level 28.2 MEQ/L 29.6 MEQ/L


 


Anion Gap 8 MEQ/L 6 MEQ/L


 


Blood Urea Nitrogen 11 MG/DL 10 MG/DL


 


Creatinine 0.75 MG/DL 0.70 MG/DL


 


Estimat Glomerular Filtration 84 ML/MIN 91 ML/MIN





Rate  


 


Random Glucose 102 MG/ MG/DL


 


Calcium Level 9.0 MG/DL 8.2 MG/DL


 


Total Bilirubin 0.5 MG/DL 0.6 MG/DL


 


Aspartate Amino Transf 26 U/L 261 U/L





(AST/SGOT)  


 


Alanine Aminotransferase 50 U/L 228 U/L





(ALT/SGPT)  


 


Alkaline Phosphatase 62 U/L 77 U/L


 


Total Protein 7.5 GM/DL 6.3 GM/DL


 


Albumin 4.4 GM/DL 3.6 GM/DL


 


Lipase 619 U/L 325 U/L








Physical Examination


HEENT: Pupils round and reactive to light; normocephalic; atraumatic; no 

jaundice.  Throat is clear.


NECK: Neck is supple, no JVD, no lymphadenopathy.


CHEST:  Chest is clear to auscultation and percussion.


CARDIAC:  Regular rate and rhythm with no murmur gallop or rubs.


ABDOMEN:  Soft, nondistended, RUQ TTP; no hepatosplenomegaly; bowel sounds are 

present in all four quadrants.


EXTREMITIES: No clubbing, cyanosis, or edema.


SKIN:  Normal; no rash; no jaundice.


CNS:  No focal deficits; alert and oriented times three.





Assessment and Plan


Plan


- Recurrent pancreatitis-  Patient had frequent pancreatitis  attacks since 

2013 of unknown etiology, she is scheduled for EUS on 3/24, she is s/p 

cholecystectomy, denies alcohol. extensive work up at recent admission (2/20/17-

2/24/17) including HIDA scan which indicated hepatomegaly without evidence 

biliary obstruction and some degree of bile gastric reflux.  CTA showed fatty 

liver. MRCP showed fatty liver as well and was otherwise unremarkable, 

immunology and serology negative, IGG4 negative. She has been taking Creon, 

clear liquids, dicyclomine and did ok until Sunday when she started having 

worsening of pain.  She has been taking hydrocodone for pain and Zofran for 

nausea which has not helped.  She has hx gastric ulcers and has been taking 

meloxicam on a daily basis since last summer.  


-Elevated LFTs. worse than last visit.  , , the rest are WNL.  

She has been taking hydrocodone for pain, she took 5-6 of them yesterday.  

Recent work up negative except fatty liver.


 This could be drug induced or PUD 


- Hx of gastric ulcers- she has been taking Meloxicam since last summer


- Depression, DM per attending 





PLAN


-clear liquids


-EGD tomorrow


-NPO after midnight


-obtain consents


-PPI


-rck LFTs, lipase in a.m.





This pt was seen examined by myself and Dr. Sagastume and this note is written 

on his behalf.








Solitario Posadas Mar 7, 2017 16:57

## 2017-03-08 VITALS
RESPIRATION RATE: 20 BRPM | HEART RATE: 67 BPM | DIASTOLIC BLOOD PRESSURE: 65 MMHG | OXYGEN SATURATION: 97 % | TEMPERATURE: 98.5 F | SYSTOLIC BLOOD PRESSURE: 114 MMHG

## 2017-03-08 VITALS
TEMPERATURE: 98.2 F | DIASTOLIC BLOOD PRESSURE: 54 MMHG | HEART RATE: 62 BPM | OXYGEN SATURATION: 95 % | RESPIRATION RATE: 18 BRPM | SYSTOLIC BLOOD PRESSURE: 109 MMHG

## 2017-03-08 VITALS
DIASTOLIC BLOOD PRESSURE: 54 MMHG | OXYGEN SATURATION: 95 % | SYSTOLIC BLOOD PRESSURE: 109 MMHG | RESPIRATION RATE: 18 BRPM | TEMPERATURE: 98.2 F | HEART RATE: 62 BPM

## 2017-03-08 VITALS
SYSTOLIC BLOOD PRESSURE: 127 MMHG | OXYGEN SATURATION: 97 % | HEART RATE: 72 BPM | TEMPERATURE: 98.7 F | DIASTOLIC BLOOD PRESSURE: 76 MMHG | RESPIRATION RATE: 20 BRPM

## 2017-03-08 LAB
ALP SERPL-CCNC: 93 U/L (ref 45–117)
ALT SERPL-CCNC: 270 U/L (ref 10–53)
ANION GAP SERPL CALC-SCNC: 9 MEQ/L (ref 5–15)
AST SERPL-CCNC: 205 U/L (ref 15–37)
BILIRUB SERPL-MCNC: 0.6 MG/DL (ref 0.2–1)
BUN SERPL-MCNC: 7 MG/DL (ref 7–18)
CHLORIDE SERPL-SCNC: 106 MEQ/L (ref 98–107)
GFR SERPLBLD BASED ON 1.73 SQ M-ARVRAT: 111 ML/MIN (ref 89–?)
HCO3 BLD-SCNC: 27 MEQ/L (ref 21–32)
POTASSIUM SERPL-SCNC: 3.6 MEQ/L (ref 3.5–5.1)
SODIUM SERPL-SCNC: 142 MEQ/L (ref 136–145)

## 2017-03-08 RX ADMIN — INSULIN ASPART SCH: 100 INJECTION, SOLUTION INTRAVENOUS; SUBCUTANEOUS at 16:00

## 2017-03-08 RX ADMIN — CYCLOBENZAPRINE HYDROCHLORIDE SCH MG: 10 TABLET, FILM COATED ORAL at 13:22

## 2017-03-08 RX ADMIN — PANCRELIPASE SCH CAP: 24000; 76000; 120000 CAPSULE, DELAYED RELEASE PELLETS ORAL at 13:21

## 2017-03-08 RX ADMIN — METOPROLOL TARTRATE SCH MG: 25 TABLET, FILM COATED ORAL at 21:37

## 2017-03-08 RX ADMIN — PANCRELIPASE SCH CAP: 24000; 76000; 120000 CAPSULE, DELAYED RELEASE PELLETS ORAL at 16:47

## 2017-03-08 RX ADMIN — SIMETHICONE SCH MG: 125 TABLET, CHEWABLE ORAL at 21:37

## 2017-03-08 RX ADMIN — SIMETHICONE SCH MG: 125 TABLET, CHEWABLE ORAL at 14:00

## 2017-03-08 RX ADMIN — THIAMINE HYDROCHLORIDE SCH MLS/HR: 100 INJECTION, SOLUTION INTRAMUSCULAR; INTRAVENOUS at 09:54

## 2017-03-08 RX ADMIN — INSULIN ASPART SCH: 100 INJECTION, SOLUTION INTRAVENOUS; SUBCUTANEOUS at 11:29

## 2017-03-08 RX ADMIN — BUDESONIDE AND FORMOTEROL FUMARATE DIHYDRATE SCH PUFF: 160; 4.5 AEROSOL RESPIRATORY (INHALATION) at 21:00

## 2017-03-08 RX ADMIN — INSULIN ASPART SCH: 100 INJECTION, SOLUTION INTRAVENOUS; SUBCUTANEOUS at 21:42

## 2017-03-08 RX ADMIN — BUDESONIDE AND FORMOTEROL FUMARATE DIHYDRATE SCH PUFF: 160; 4.5 AEROSOL RESPIRATORY (INHALATION) at 09:00

## 2017-03-08 RX ADMIN — ONDANSETRON PRN MG: 2 INJECTION, SOLUTION INTRAMUSCULAR; INTRAVENOUS at 16:47

## 2017-03-08 RX ADMIN — FLUTICASONE FUROATE AND VILANTEROL TRIFENATATE SCH PUFF: 100; 25 POWDER RESPIRATORY (INHALATION) at 09:00

## 2017-03-08 RX ADMIN — SODIUM CHLORIDE, PRESERVATIVE FREE SCH ML: 5 INJECTION INTRAVENOUS at 09:00

## 2017-03-08 RX ADMIN — PANTOPRAZOLE SODIUM SCH MG: 40 INJECTION, POWDER, FOR SOLUTION INTRAVENOUS at 21:38

## 2017-03-08 RX ADMIN — METOPROLOL TARTRATE SCH MG: 25 TABLET, FILM COATED ORAL at 09:54

## 2017-03-08 RX ADMIN — SODIUM CHLORIDE, PRESERVATIVE FREE SCH ML: 5 INJECTION INTRAVENOUS at 21:00

## 2017-03-08 RX ADMIN — INSULIN ASPART SCH: 100 INJECTION, SOLUTION INTRAVENOUS; SUBCUTANEOUS at 07:00

## 2017-03-08 RX ADMIN — CYCLOBENZAPRINE HYDROCHLORIDE SCH MG: 10 TABLET, FILM COATED ORAL at 16:47

## 2017-03-08 RX ADMIN — CITALOPRAM SCH MG: 40 TABLET, FILM COATED ORAL at 21:37

## 2017-03-08 RX ADMIN — CYCLOBENZAPRINE HYDROCHLORIDE SCH MG: 10 TABLET, FILM COATED ORAL at 09:55

## 2017-03-08 RX ADMIN — PANCRELIPASE SCH CAP: 24000; 76000; 120000 CAPSULE, DELAYED RELEASE PELLETS ORAL at 09:54

## 2017-03-08 RX ADMIN — LISINOPRIL SCH MG: 5 TABLET ORAL at 09:55

## 2017-03-08 NOTE — HHI.DCPOC
Discharge Care Plan


Diagnosis:  


(1) Pancreatitis


(2) Gastritis


(3) LFT elevation


(4) DM2 (diabetes mellitus, type 2)


(5) Hypertension


(6) Major depression


Goals to Promote Your Health


* To prevent worsening of your condition and complications


* To maintain your health at the optimal level


Directions to Meet Your Goals


*** Take your medications as prescribed


*** Follow your dietary instruction


*** Follow activity as directed








*** Keep your appointments as scheduled


*** Take your immunizations and boosters as scheduled


*** If your symptoms worsen call your PCP, if no PCP go to Urgent Care Center 

or Emergency Room***


*** Smoking is Dangerous to Your Health. Avoid second hand smoke***


***Call the 24-hour hour crisis hotline for domestic abuse at 1-957.780.8361***








Julius Jurado MD Mar 8, 2017 14:39

## 2017-03-08 NOTE — HHI.GIFU
Subjective


Remarks


still C/O abdominal discomfort and pain. no nausea or vomiting





Objective


Vitals I&O





 Vital Signs








  Date Time  Temp Pulse Resp B/P Pulse Ox O2 Delivery O2 Flow Rate FiO2


 


3/8/17 16:32 98.5 67 20 114/65 97   


 


3/8/17 12:52 98.7 72 20 127/76 97   


 


3/8/17 09:03  65 18 135/71 98   


 


3/8/17 08:57  65 18 143/77 100   


 


3/8/17 08:51 99.0 87 20 148/73 100   


 


3/8/17 08:15 98.2 62 18 109/54 95   


 


3/8/17 06:27 98.2 62 18 109/54 95   


 


3/7/17 20:52 98.2 83 18 122/67 95   








 I/O








 3/7/17 3/7/17 3/7/17 3/8/17 3/8/17 3/8/17





 07:00 15:00 23:00 07:00 15:00 23:00


 


Intake Total     600 ml 


 


Balance     600 ml 


 


      


 


Intake IV Total     600 ml 


 


# Voids 1    4 








Laboratory





Laboratory Tests








Test 3/8/17





 04:41


 


Sodium Level 142 


 


Potassium Level 3.6 


 


Chloride Level 106 


 


Carbon Dioxide Level 27.0 


 


Anion Gap 9 


 


Blood Urea Nitrogen 7 


 


Creatinine 0.59 


 


Estimat Glomerular Filtration 111 





Rate 


 


Random Glucose 121 


 


Calcium Level 8.4 


 


Total Bilirubin 0.6 


 


Aspartate Amino Transf 205 





(AST/SGOT) 


 


Alanine Aminotransferase 270 





(ALT/SGPT) 


 


Alkaline Phosphatase 93 


 


Total Protein 6.4 


 


Albumin 3.6 


 


Lipase 276 








Physical Exam


HEENT: Pupils round and reactive to light; normocephalic; atraumatic; no 

jaundice.  Throat is clear.


NECK: Neck is supple, no JVD, no lymphadenopathy.


CHEST:  Chest is clear to auscultation and percussion.


CARDIAC:  Regular rate and rhythm with no murmur gallop or rubs.


ABDOMEN:  Soft, nondistended, moderate tenderness in RUQ; no hepatosplenomegaly

; bowel sounds are present in all four quadrants.


EXTREMITIES: No clubbing, cyanosis, or edema.


SKIN:  Normal; no rash; no jaundice.


CNS:  No focal deficits; alert and oriented times three.





Assessment and Plan


Plan


- Recurrent pancreatitis-  Patient had frequent pancreatitis  attacks since 

2013 of unknown etiology, she is scheduled for EUS on 3/24, she is s/p 

cholecystectomy, denies alcohol. extensive work up at recent admission (2/20/17-

2/24/17) including HIDA scan which indicated hepatomegaly without evidence 

biliary obstruction and some degree of bile gastric reflux.  CTA showed fatty 

liver. MRCP showed fatty liver as well and was otherwise unremarkable, 

immunology and serology negative, IGG4 negative. She has been taking Creon, 

clear liquids, dicyclomine and did ok until Sunday when she started having 

worsening of pain.  She has been taking hydrocodone for pain and Zofran for 

nausea which has not helped.  She has hx gastric ulcers and has been taking 

meloxicam on a daily basis since last summer.  


-Elevated LFTs. worse than last visit.  , , the rest are WNL.  

She has been taking hydrocodone for pain, she took 5-6 of them yesterday.  

Recent work up negative except fatty liver.


 This could be drug induced or PUD 


- Hx of gastric ulcers- she has been taking Meloxicam since last summer


- Depression, DM per attending 





2-8-17 pancreatitis lipase better but LFTs elevated, so if LFTs tomorrow we 

will do work up to r/o reason for that, we will check liver US.





EGD showed gastritis Bx done





PLAN


-full liquids diet


-liver US


-PPI


-rck LFTs,








Adam Sagastume MD Mar 8, 2017 18:48

## 2017-03-08 NOTE — HHI.PR
Subjective


Remarks


s/p egd. seems to tolerate liquid





Objective


Vitals


heart reg


lung cta


abd s/nt


ext no edema


 Vital Signs








  Date Time  Temp Pulse Resp B/P Pulse Ox O2 Delivery O2 Flow Rate FiO2


 


3/8/17 09:03  65 18 135/71 98   


 


3/8/17 08:57  65 18 143/77 100   


 


3/8/17 08:51 99.0 87 20 148/73 100   


 


3/8/17 08:15 98.2 62 18 109/54 95   


 


3/8/17 06:27 98.2 62 18 109/54 95   


 


3/7/17 20:52 98.2 83 18 122/67 95   


 


3/7/17 16:10 97.9 71 18 144/82 98   


 


3/7/17 12:05 97.8 72 18 148/72 95   








Result Diagram:  


3/7/17 0715                                                                    

            3/8/17 0441








A/P


Problem List:  


(1) Pancreatitis


Status:  Acute


Plan:  


- Pt has had recurrent pancreatitis of unclear etiology


- She has had about 5 episodes of pancreatitis diagnosed since 4/2013. She 

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


 prior to the onset of her symptoms. She denies any alcohol use at all. Pt had 

her GB removed previously. 


- Pt is following with GI outpt and is planned for EUS on 3/24/17


- egd 3/8...gastritis





GI following for pancreatitis and elevation of lft.


advance diet.


d/c dilaudid


d/c later if tolerating diet and pain control.


-no nsaids.


- ppi





(2) DM2 (diabetes mellitus, type 2)


Status:  Chronic


Plan:  


- NovoLog SSI


- Accu checks 





(3) Hypertension


Status:  Chronic


Plan:  


- Continue home meds











Julius Jurado MD Mar 8, 2017 11:25

## 2017-08-09 ENCOUNTER — HOSPITAL ENCOUNTER (EMERGENCY)
Dept: HOSPITAL 17 - PHED | Age: 47
Discharge: HOME | End: 2017-08-09
Payer: SELF-PAY

## 2017-08-09 VITALS — SYSTOLIC BLOOD PRESSURE: 140 MMHG | DIASTOLIC BLOOD PRESSURE: 66 MMHG

## 2017-08-09 VITALS — HEART RATE: 83 BPM | OXYGEN SATURATION: 98 % | RESPIRATION RATE: 16 BRPM

## 2017-08-09 VITALS — OXYGEN SATURATION: 95 %

## 2017-08-09 VITALS
SYSTOLIC BLOOD PRESSURE: 139 MMHG | DIASTOLIC BLOOD PRESSURE: 60 MMHG | OXYGEN SATURATION: 95 % | HEART RATE: 88 BPM | TEMPERATURE: 98.3 F | RESPIRATION RATE: 20 BRPM

## 2017-08-09 VITALS — RESPIRATION RATE: 18 BRPM

## 2017-08-09 VITALS — WEIGHT: 210.76 LBS | HEIGHT: 64 IN | BODY MASS INDEX: 35.98 KG/M2

## 2017-08-09 DIAGNOSIS — Z87.19: ICD-10-CM

## 2017-08-09 DIAGNOSIS — Z87.39: ICD-10-CM

## 2017-08-09 DIAGNOSIS — E78.00: ICD-10-CM

## 2017-08-09 DIAGNOSIS — Z87.09: ICD-10-CM

## 2017-08-09 DIAGNOSIS — R53.1: ICD-10-CM

## 2017-08-09 DIAGNOSIS — I10: ICD-10-CM

## 2017-08-09 DIAGNOSIS — R11.0: ICD-10-CM

## 2017-08-09 DIAGNOSIS — E11.9: ICD-10-CM

## 2017-08-09 DIAGNOSIS — N39.0: Primary | ICD-10-CM

## 2017-08-09 DIAGNOSIS — R79.89: ICD-10-CM

## 2017-08-09 DIAGNOSIS — Z86.59: ICD-10-CM

## 2017-08-09 DIAGNOSIS — Z86.79: ICD-10-CM

## 2017-08-09 DIAGNOSIS — Z79.4: ICD-10-CM

## 2017-08-09 LAB
ALP SERPL-CCNC: 82 U/L (ref 45–117)
ALT SERPL-CCNC: 48 U/L (ref 10–53)
ANION GAP SERPL CALC-SCNC: 8 MEQ/L (ref 5–15)
AST SERPL-CCNC: 29 U/L (ref 15–37)
BACTERIA #/AREA URNS HPF: (no result) /HPF
BASOPHILS # BLD AUTO: 0.1 TH/MM3 (ref 0–0.2)
BASOPHILS NFR BLD: 0.6 % (ref 0–2)
BETA HCG QUANT: 3 MIU/ML (ref 0–5)
BILIRUB SERPL-MCNC: 0.4 MG/DL (ref 0.2–1)
BUN SERPL-MCNC: 7 MG/DL (ref 7–18)
CHLORIDE SERPL-SCNC: 100 MEQ/L (ref 98–107)
CK SERPL-CCNC: 54 U/L (ref 26–192)
COLOR UR: (no result)
COMMENT (UR): (no result)
CULTURE IF INDICATED: (no result)
EOSINOPHIL # BLD: 0.2 TH/MM3 (ref 0–0.4)
EOSINOPHIL NFR BLD: 2.3 % (ref 0–4)
ERYTHROCYTE [DISTWIDTH] IN BLOOD BY AUTOMATED COUNT: 12.5 % (ref 11.6–17.2)
GFR SERPLBLD BASED ON 1.73 SQ M-ARVRAT: 95 ML/MIN (ref 89–?)
GLUCOSE UR STRIP-MCNC: 500 MG/DL
HCO3 BLD-SCNC: 27.5 MEQ/L (ref 21–32)
HCT VFR BLD CALC: 42 % (ref 35–46)
HEMO FLAGS: (no result)
HGB UR QL STRIP: (no result)
KETONES UR STRIP-MCNC: (no result) MG/DL
LEUKOCYTE ESTERASE UR QL STRIP: (no result) /HPF (ref 0–5)
LYMPHOCYTES # BLD AUTO: 3.5 TH/MM3 (ref 1–4.8)
LYMPHOCYTES NFR BLD AUTO: 41.9 % (ref 9–44)
MCH RBC QN AUTO: 30.1 PG (ref 27–34)
MCHC RBC AUTO-ENTMCNC: 33.6 % (ref 32–36)
MCV RBC AUTO: 89.5 FL (ref 80–100)
MONOCYTES NFR BLD: 6.3 % (ref 0–8)
NEUTROPHILS # BLD AUTO: 4 TH/MM3 (ref 1.8–7.7)
NEUTROPHILS NFR BLD AUTO: 48.9 % (ref 16–70)
NITRITE UR QL STRIP: (no result)
PLATELET # BLD: 237 TH/MM3 (ref 150–450)
POTASSIUM SERPL-SCNC: 4.1 MEQ/L (ref 3.5–5.1)
RBC # BLD AUTO: 4.69 MIL/MM3 (ref 4–5.3)
RBC #/AREA URNS HPF: (no result) /HPF (ref 0–3)
SODIUM SERPL-SCNC: 135 MEQ/L (ref 136–145)
SP GR UR STRIP: 1.01 (ref 1–1.03)
SQUAMOUS #/AREA URNS HPF: (no result) /HPF (ref 0–5)
WBC # BLD AUTO: 8.3 TH/MM3 (ref 4–11)

## 2017-08-09 PROCEDURE — 83690 ASSAY OF LIPASE: CPT

## 2017-08-09 PROCEDURE — 87086 URINE CULTURE/COLONY COUNT: CPT

## 2017-08-09 PROCEDURE — 96361 HYDRATE IV INFUSION ADD-ON: CPT

## 2017-08-09 PROCEDURE — 80053 COMPREHEN METABOLIC PANEL: CPT

## 2017-08-09 PROCEDURE — 99284 EMERGENCY DEPT VISIT MOD MDM: CPT

## 2017-08-09 PROCEDURE — 82550 ASSAY OF CK (CPK): CPT

## 2017-08-09 PROCEDURE — 81001 URINALYSIS AUTO W/SCOPE: CPT

## 2017-08-09 PROCEDURE — 96374 THER/PROPH/DIAG INJ IV PUSH: CPT

## 2017-08-09 PROCEDURE — 85025 COMPLETE CBC W/AUTO DIFF WBC: CPT

## 2017-08-09 PROCEDURE — 84702 CHORIONIC GONADOTROPIN TEST: CPT

## 2017-08-09 NOTE — PD
HPI


Chief Complaint:  Flank/Kidney Pain


Time Seen by Provider:  19:14


Travel History


International Travel<30 days:  No


Contact w/Intl Traveler<30days:  No


Traveled to known affect area:  No





History of Present Illness


HPI


Patient is a 47) suprapubic abdominal pain as well as dysuria for the past 

week.  Patient states she saw another physician and started on Bactrim 3 days 

ago.  She has despite this very weak and feeling like she was dehydrated 

earlier today.  She states she was working fairly aggressive the past and let 

herself dehydrated.  Patient also states she's been taking Azo at home.  Denies 

any objective fevers denies any vomiting but does endorse some mild nausea.  

Denies any vaginal bleeding vaginal discharge diarrhea or constipation.  Denies 

a history of kidney stones denies any flank pain.  She states one time in the 

past and admitted for a kidney infection





PFS


Past Medical History


Arthritis:  No


Asthma:  Yes


Anxiety:  Yes


Depression:  Yes


Heart Rhythm Problems:  No


Cancer:  No


Cardiac Catheterization:  No


Cardiovascular Problems:  Yes


High Cholesterol:  Yes (at one time, pt takes fish oil)


Chest Pain:  No


Congestive Heart Failure:  No


COPD:  No


Diabetes:  Yes


Diminished Hearing:  No


Endocrine:  Yes


Gastrointestinal Disorders:  Yes


GERD:  Yes


Genitourinary:  No


Hiatal Hernia:  No


Hypertension:  Yes


Immune Disorder:  No


Implanted Vascular Access Dvce:  No


Kidney Stones:  No


Musculoskeletal:  Yes


Neurologic:  No


Psychiatric:  Yes


Reproductive:  Yes (HYSTERECTOMY)


Respiratory:  Yes


Immunizations Current:  Yes


Migraines:  No


Pancreatitis:  Yes


Sleep Apnea:  No


Thyroid Disease:  No


Ulcer:  Yes


Pregnant?:  Not Pregnant


:  1


Para:  1





Past Surgical History


Abdominal Surgery:  Yes (ENDOMETRIAL TISSUE REMOVED )


Appendectomy:  Yes


Cardiac Surgery:  No


 Section:  Yes ()


Cholecystectomy:  Yes


Coronary Artery Bypass Graft:  No


Ear Surgery:  No


Endocrine Surgery:  No


Eye Surgery:  No


Genitourinary Surgery:  No


Gynecologic Surgery:  Yes (EXPLORATORY LAP , EXPL LAP  C CAUTERIZATION  

)


Hysterectomy:  Yes


Insulin Pump:  No


Joint Replacement:  No


Oral Surgery:  No


Pacemaker:  No


Thoracic Surgery:  No


Other Surgery:  Yes (EXP LAP, EXP ABD SURGERY)





Social History


Alcohol Use:  No


Tobacco Use:  No


Substance Use:  No





Allergies-Medications


(Allergen,Severity, Reaction):  


Coded Allergies:  


     Morphine (Verified  Allergy, Intermediate, HIVES, 17)


     Sulfa (Verified  Allergy, Intermediate, HIVES, 17)


Reported Meds & Prescriptions





Reported Meds & Active Scripts


Active


Keflex (Cephalexin) 500 Mg Capsule 500 Mg PO QID 5 Days


Creon (Amylase/Lipase/Protease) 24,000-76,000-120,000 Units Cap 2 Cap PO TID


Bentyl (Dicyclomine HCl) 10 Mg Cap 10 Mg PO TID PRN


Pantoprazole (Pantoprazole Sodium) 40 Mg Tab 40 Mg PO DAILY


Metformin (Metformin HCl) 1,000 Mg Tab 1,000 Mg PO BIDPC


     With meals


Reported


Celebrex (Celecoxib) 200 Mg Cap 200 Mg PO DAILY


Lantus Inj (Insulin Glargine) 100 Unit/Ml Inj 10  DAILY


Trazodone (Trazodone HCl) 150 Mg Tablet 150 Mg PO HS


Lisinopril 20 Mg Tab 20 Mg PO DAILY


Vyvanse (Lisdexamfetamine Dimesylate) 70 Mg Cap 75 Mg PO DAILY


Gnp Melatonin (Melatonin) 3 Mg Tab 1 Tab PO DAILY


Fish Oil (Omega-3 Fatty Acids) 500 Mg Cap 1 Cap PO DAILY


Celexa (Citalopram Hydrobromide) 40 Mg Tab 60 Mg PO DAILY


Flexeril (Cyclobenzaprine HCl) 10 Mg Tab 10 Mg PO TID


Hydrocodone-Acetaminophen 7.5-325 mg Tab 1 Tab PO DAILY PRN


Breo Ellipta Inh (Fluticasone/Vilanterol) 100-25 Mcg/Act Inh 1 Puff INH DAILY


     Use daily at the same time.


Proventil Hfa 6.7 GM Inh (Albuterol Sulfate) 90 Mcg/Act Aer 1 Puff INH Q4H PRN


Metoprolol Tartrate 25 Mg Tab 25 Mg PO BID








Review of Systems


Except as stated in HPI:  all other systems reviewed are Neg





Physical Exam


Narrative


GENERAL: Well-developed well-nourished, overweight no obvious distress per


SKIN: Focused skin assessment warm/dry.


HEAD: Atraumatic. Normocephalic. 


EYES: Pupils equal and round. No scleral icterus. No injection or drainage. 


ENT: No nasal bleeding or discharge.  Mucous membranes pink and moist.


NECK: Trachea midline. No JVD. 


CARDIOVASCULAR: Regular rate and rhythm.  No murmur appreciated.


RESPIRATORY: No accessory muscle use. Clear to auscultation. Breath sounds 

equal bilaterally. 


GASTROINTESTINAL: Abdomen soft, non-tender, nondistended. Hepatic and splenic 

margins not palpable.  No CVA tenderness, no rebound no percussive tenderness, 

normal active bowel sounds.


MUSCULOSKELETAL: No obvious deformities. No clubbing.  No cyanosis.  No edema. 


NEUROLOGICAL: Awake and alert. No obvious cranial nerve deficits.  Motor 

grossly within normal limits. Normal speech.


PSYCHIATRIC: Appropriate mood and affect; insight and judgment normal.





Data


Data


Last Documented VS





Vital Signs








  Date Time  Temp Pulse Resp B/P Pulse Ox O2 Delivery O2 Flow Rate FiO2


 


17 21:36  85 18 140/66 97   


 


17 20:33      Room Air  


 


17 18:26 98.3       








Orders





 Beta Hcg (Quant/Titer) (17 19:31)


Complete Blood Count With Diff (17 19:31)


Comprehensive Metabolic Panel (17 19:31)


Lipase (17 19:31)


Urinalysis - C+S If Indicated (17 19:31)


Iv Access Insert/Monitor (17 19:31)


Ecg Monitoring (17 19:31)


Oximetry (17 19:31)


Sodium Chlor 0.9% 1000 Ml Inj (Ns 1000 M (17 19:31)


Sodium Chloride 0.9% Flush (Ns Flush) (17 19:45)


Ketorolac Inj (Toradol Inj) (17 19:45)


Creatine Kinase (Cpk) (17 19:31)


Urine Culture (17 20:30)


Cephalexin (Keflex) (17 21:30)





Labs





 Laboratory Tests








Test 17





 20:30


 


White Blood Count 8.3 TH/MM3


 


Red Blood Count 4.69 MIL/MM3


 


Hemoglobin 14.1 GM/DL


 


Hematocrit 42.0 %


 


Mean Corpuscular Volume 89.5 FL


 


Mean Corpuscular Hemoglobin 30.1 PG


 


Mean Corpuscular Hemoglobin 33.6 %





Concent 


 


Red Cell Distribution Width 12.5 %


 


Platelet Count 237 TH/MM3


 


Mean Platelet Volume 8.5 FL


 


Neutrophils (%) (Auto) 48.9 %


 


Lymphocytes (%) (Auto) 41.9 %


 


Monocytes (%) (Auto) 6.3 %


 


Eosinophils (%) (Auto) 2.3 %


 


Basophils (%) (Auto) 0.6 %


 


Neutrophils # (Auto) 4.0 TH/MM3


 


Lymphocytes # (Auto) 3.5 TH/MM3


 


Monocytes # (Auto) 0.5 TH/MM3


 


Eosinophils # (Auto) 0.2 TH/MM3


 


Basophils # (Auto) 0.1 TH/MM3


 


CBC Comment DIFF FINAL 


 


Differential Comment  


 


Urine Color ORANGE 


 


Urine Turbidity CLEAR 


 


Urine pH 5.5 


 


Urine Specific Gravity 1.010 


 


Urine Protein TRACE mg/dL


 


Urine Glucose (UA) 500 mg/dL


 


Urine Ketones NEG mg/dL


 


Urine Occult Blood NEG 


 


Urine Nitrite POS 


 


Urine Bilirubin NEG 


 


Urine Leukocyte Esterase NEG 


 


Urine RBC 0-2 /hpf


 


Urine WBC 3-5 /hpf


 


Urine WBC Clumps FEW 


 


Urine Squamous Epithelial 0-5 /hpf





Cells 


 


Urine Bacteria FEW /hpf


 


Microscopic Urinalysis Comment CULTURE





 INDICATED


 


Sodium Level 135 MEQ/L


 


Potassium Level 4.1 MEQ/L


 


Chloride Level 100 MEQ/L


 


Carbon Dioxide Level 27.5 MEQ/L


 


Anion Gap 8 MEQ/L


 


Blood Urea Nitrogen 7 MG/DL


 


Creatinine 0.67 MG/DL


 


Estimat Glomerular Filtration 95 ML/MIN





Rate 


 


Random Glucose 232 MG/DL


 


Calcium Level 8.9 MG/DL


 


Total Bilirubin 0.4 MG/DL


 


Aspartate Amino Transf 29 U/L





(AST/SGOT) 


 


Alanine Aminotransferase 48 U/L





(ALT/SGPT) 


 


Alkaline Phosphatase 82 U/L


 


Total Creatine Kinase 54 U/L


 


Total Protein 7.3 GM/DL


 


Albumin 3.9 GM/DL


 


Lipase 1434 U/L


 


Human Chorionic Gonadotropin, 3 MIU/ML





Quant 











MDM


Medical Decision Making


Medical Screen Exam Complete:  Yes


Emergency Medical Condition:  Yes


Differential Diagnosis


UTI, pyelonephritis unlikely, sepsis unlikely, pancreatitis, gastritis, gastric 

enteritis.


Narrative Course


Patient roomed in the emergency department, appears well in no obvious 

distress.  Given Toradol and had complete relief for symptoms.  Patient does 

have a mild elevation in lipase and does have nitrate positive urine.  

Discussed with her continuing Macrobid until finished and will add Keflex.  On 

the subject of her elevation of lipase patient is not having any epigastric 

pain is been tolerating by mouth fluids.  She was told she has chronic 

pancreatitis and review of her records shows that she's been here multiple 

times for her pancreas.  She is tolerating by mouth fluids emerge department at 

that she can go home.  Her abdomen is benign.  This was discussed with her and 

she is agreeable.  Discussed symptomatic management returned ED criteria as 

well as follow-up the primary care physician.





Diagnosis





 Primary Impression:  


 UTI (urinary tract infection)


 Additional Impression:  


 Elevated lipase


***Med/Other Pt SpecificInfo:  Prescription(s) given


Scripts


Cephalexin (Keflex)500 Mg [id number] Mg PO QID  5 Days  Ref 0


   Prov:Ben Bar MD         17


Disposition:  01 DISCHARGE HOME


Condition:  Stable








Ben Bar MD Aug 9, 2017 19:47

## 2018-02-21 ENCOUNTER — HOSPITAL ENCOUNTER (EMERGENCY)
Dept: HOSPITAL 17 - PHEFT | Age: 48
Discharge: HOME | End: 2018-02-21
Payer: COMMERCIAL

## 2018-02-21 VITALS
TEMPERATURE: 99.5 F | DIASTOLIC BLOOD PRESSURE: 79 MMHG | SYSTOLIC BLOOD PRESSURE: 136 MMHG | RESPIRATION RATE: 16 BRPM | HEART RATE: 78 BPM | OXYGEN SATURATION: 95 %

## 2018-02-21 VITALS — WEIGHT: 210.54 LBS | BODY MASS INDEX: 35.94 KG/M2 | HEIGHT: 64 IN

## 2018-02-21 DIAGNOSIS — S91.312A: Primary | ICD-10-CM

## 2018-02-21 DIAGNOSIS — I10: ICD-10-CM

## 2018-02-21 DIAGNOSIS — J45.909: ICD-10-CM

## 2018-02-21 DIAGNOSIS — Z88.5: ICD-10-CM

## 2018-02-21 DIAGNOSIS — F32.9: ICD-10-CM

## 2018-02-21 DIAGNOSIS — Z88.2: ICD-10-CM

## 2018-02-21 DIAGNOSIS — E11.9: ICD-10-CM
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
## 2018-02-21 DIAGNOSIS — E78.00: ICD-10-CM

## 2018-02-21 PROCEDURE — 99283 EMERGENCY DEPT VISIT LOW MDM: CPT

## 2018-02-21 PROCEDURE — L3260 AMBULATORY SURGICAL BOOT EAC: HCPCS

## 2018-02-21 PROCEDURE — 73630 X-RAY EXAM OF FOOT: CPT

## 2018-02-21 PROCEDURE — 12002 RPR S/N/AX/GEN/TRNK2.6-7.5CM: CPT

## 2018-02-21 NOTE — RADRPT
EXAM DATE/TIME:  02/21/2018 17:34 

 

[facility] COMPARISON:     

No previous studies available for comparison.

 

                     

INDICATIONS :     

Patient lacerated left foot today on glass

                     

 

MEDICAL HISTORY :     

None.          

 

SURGICAL HISTORY :     

None.   

 

ENCOUNTER:     

Initial                                        

 

ACUITY:     

1 day      

 

PAIN SCORE:     

3/10

 

LOCATION:     

Left  plantar surface on medial side of foot

 

FINDINGS:     

Three view examination of the left foot demonstrates no bony destruction, dislocation, or fracture.  
 The tarsal bones appear intact.  The interphalangeal and metatarsophalangeal joints are intact.  The
 calcaneus is intact.  Bony mineralization is normal. Tiny plantar heel spur. No radiodense foreign b
smith is appreciated.

 

CONCLUSION:     No acute bony findings.

 

 

 

 Francisco Callahan MD on February 21, 2018 at 18:03           

Board Certified Radiologist.

 This report was verified electronically.

## 2018-02-21 NOTE — PD
HPI


Chief Complaint:  Laceration/Skin Injury


Time Seen by Provider:  17:07


Travel History


International Travel<30 days:  No


Contact w/Intl Traveler<30days:  No


Traveled to known affect area:  No





History of Present Illness


HPI


Patient comes to the emergency department complaining of laceration to the 

medial aspect going into the plantar surface of her left foot that occurred 

around 11:00 this morning when she accidentally stepped on a broken glass while 

she was barefoot.  Patient reports that she cleaned and applied pressure 

however every time she wants that she tries to walk on it, then it starts 

bleeding again so she decided to come to the emergency department.  Patient 

reports her tetanus shot is up-to-date.  Patient reports a pressure dressing 

has alleviated pain.  Patient reports burning sensation inside the laceration 

without radiation.  Not having covered makes the pain worse.  Denies any 

numbness or tingling.





PFSH


Past Medical History


Arthritis:  No


Asthma:  Yes


Anxiety:  Yes


Depression:  Yes


Heart Rhythm Problems:  No


Cancer:  No


Cardiac Catheterization:  No


Cardiovascular Problems:  Yes


High Cholesterol:  Yes (at one time, pt takes fish oil)


Chest Pain:  No


Congestive Heart Failure:  No


COPD:  No


Diabetes:  Yes


Patient Takes Glucophage:  Yes (TODAY)


Diminished Hearing:  No


Endocrine:  Yes


Gastrointestinal Disorders:  Yes (PAIN TO RIGHT UPPER QUAD, WORSE THIS TIME)


GERD:  Yes


Genitourinary:  No


Hiatal Hernia:  No


Hypertension:  Yes


Immune Disorder:  No


Implanted Vascular Access Dvce:  No


Kidney Stones:  No


Musculoskeletal:  Yes


Neurologic:  No


Psychiatric:  Yes


Reproductive:  Yes (HYSTERECTOMY)


Respiratory:  Yes


Immunizations Current:  Yes


Migraines:  No


Pancreatitis:  Yes


Sleep Apnea:  No


Thyroid Disease:  No


Ulcer:  Yes


Influenza Vaccination:  Yes


Pregnant?:  Not Pregnant


:  1


Para:  1





Past Surgical History


Abdominal Surgery:  Yes (ENDOMETRIAL TISSUE REMOVED )


Appendectomy:  Yes


Cardiac Surgery:  No


 Section:  Yes ()


Cholecystectomy:  Yes


Coronary Artery Bypass Graft:  No


Ear Surgery:  No


Endocrine Surgery:  No


Eye Surgery:  No


Genitourinary Surgery:  No


Gynecologic Surgery:  Yes (EXPLORATORY LAP , EXPL LAP  C CAUTERIZATION  

)


Hysterectomy:  Yes


Insulin Pump:  No


Joint Replacement:  No


Oral Surgery:  No


Pacemaker:  No


Thoracic Surgery:  No


Other Surgery:  Yes (EXP LAP, EXP ABD SURGERY)





Social History


Alcohol Use:  No


Tobacco Use:  No


Substance Use:  No





Allergies-Medications


(Allergen,Severity, Reaction):  


Coded Allergies:  


     Sulfa (Sulfonamide Antibiotics) (Unverified  Allergy, Intermediate, HIVES, 

18)


     morphine (Unverified  Allergy, Intermediate, HIVES, 18)


Reported Meds & Prescriptions





Reported Meds & Active Scripts


Active


Diflucan (Fluconazole) 150 Mg Tab 150 Mg PO ONCE


Cipro (Ciprofloxacin HCl) 250 Mg Tab 250 Mg PO BID 5 Days


Metformin (Metformin HCl) 1,000 Mg Tab 1,000 Mg PO BIDPC


     With meals


Reported


Trazodone (Trazodone HCl) 150 Mg Tablet 150 Mg PO HS


Celexa (Citalopram Hydrobromide) 40 Mg Tab 60 Mg PO DAILY


Hydrocodone-Acetaminophen 7.5-325 mg Tab 1 Tab PO DAILY PRN


Proventil Hfa 6.7 GM Inh (Albuterol Sulfate) 90 Mcg/Act Aer 1 Puff INH Q4H PRN


Metoprolol Tartrate 25 Mg Tab 25 Mg PO BID








Review of Systems


Except as stated in HPI:  all other systems reviewed are Neg





Physical Exam


Narrative


GENERAL: Well-developed, overly nourished, in no acute distress, and non-ill 

appearing.


SKIN: Laceration noted over the medial aspect going into the plantar surface of 

the left foot.  No foreign body.  No crepitus.  No tendon involvement.  

Neurovascularly intact distally.  Capillary refill less than 2 seconds.  Full 

range of motion distally.


HEAD: Atraumatic. Normocephalic. 


EYES: Pupils equal and round. EOMI. No scleral icterus. No injection or 

drainage. 


ENT: No nasal bleeding or discharge.  Mucous membranes pink and moist.


NECK: Trachea midline. Supple.  No nuclear rigidity.


RESPIRATORY: No accessory muscle use.  No respiratory distress. 


MUSCULOSKELETAL: No obvious deformities. No clubbing.  No cyanosis.  No edema.  

Full range of motion.


NEUROLOGICAL: Awake and alert. No obvious cranial nerve deficits.  Motor 

grossly within normal limits. Normal speech.


PSYCHIATRIC: Appropriate mood and affect; insight and judgment normal.





Data


Data


Last Documented VS





Vital Signs








  Date Time  Temp Pulse Resp B/P (MAP) Pulse Ox O2 Delivery O2 Flow Rate FiO2


 


18 14:47 99.5 78 16 136/79 (98) 95   








Orders





 Orders


Foot, Complete ([id number]) (18 )


Lidocai-Epi 1%-1:100,000 Inj (Xylocaine- (18 17:15)


Ed Discharge Order (18 18:32)


Splint Or Brace Apply/Monitor (18 18:32)


Wound Care (18 18:32)


Shoe Cast (18 )








MDM


Medical Decision Making


Medical Screen Exam Complete:  Yes


Emergency Medical Condition:  Yes


Interpretation(s)





Last Impressions








Foot X-Ray 18 0000 Signed





Impressions: 





 Service Date/Time:  2018 17:34 - CONCLUSION: No acute 





 bony findings.     Francisco Callahan MD 








Differential Diagnosis


Laceration, foreign body, tendon injury, abrasion


Narrative Course


The patient suffered laceration to the extremity. There was no evidence to 

suggest foreign bodies. Visual, tactile and radiographic exams were 

unremarkable without evidence of foreign body at this time. There was no 

evidence of neurovascular injury. The patient had a normal distal vascular exam

, and had full normal motor and sensory exams. There was also no evidence or 

tendon injury, with normal distal full range of motions, flexion, extension, 

abduction, adduction and opponens. There was no evidence of local joint space 

involvement at this time. The patient was irrigated with copious sterile normal 

saline and primary repair was performed. Please see procedure note. The patient 

was given signs and symptom warnings for infection, such as increasing pain, 

redness, swelling, associated heat, pus or fever. The patient was warned of 

possible unseen foreign body and instructed to return immediately if signs or 

symptoms develop. The patient was given instructions for timely follow up and 

for removal. The patient agreed with plan of care.





Patient in no obvious distress upon re-evaluation. All pertinent Radiology 

result(s) discussed with patient/family. Patient was asked if they wanted to 

speak to my attending, which the patient did not wish to do at this time.  Any 

questions/concerns in reference to patient diagnosis/condition discussed and 

clarified prior to patient's discharge. Reinforced sheer importance of close 

follow up with podiatrist. Instructed patient to return to ED immediately, if 

symptoms return/worsen. Patient showed understanding of above instructions.  

Further instructions and recommendations were detailed in discharge paperwork.  

Patient ambulated without difficulty out of ED at discharge.





Procedures


**Procedure Narrative**


LACERATION REPAIR


LOCATION: Medial aspect going to the plantar surface of the left foot


LENGTH: Approximately 5 cm in total length


NUMBER OF STITCHES/STAPLES: 5 simple mattress and 2 simple interrupted





REPAIR: Verbal consent was obtained.  The area of the laceration was cleaned 

and prepped.  The laceration was infiltrated with lidocaine with epi.  The 

wound was copiously irrigated with 2 L of normal saline and explored without 

evidence of foreign body, bony involvement, ligament injury, tendon injury, or 

neurovascular injury.  The wound was closed using 3-0 Ethilon. This was a 

single layer repair. A sterile dressing was applied by nurse. The patient was 

advised to keep the affected area as clean and dry as possible using soap and 

water.  There were no complications. Patient tolerated the procedure well.





Diagnosis





 Primary Impression:  


 Foot laceration


 Qualified Codes:  S91.312A - Laceration without foreign body, left foot, 

initial encounter


Referrals:  


Chinmay Brantley DPWILLIAM


Patient Instructions:  Care For Your Stitches (ED), General Instructions, 

Laceration (ED)


Departure Forms:  Work Release   Enter return to work date:  2018





***Additional Instructions:  


Follow-up with podiatrist in 24-48 hours for reevaluation.  Take all medication 

as prescribed.  Keep wound dry and clean as possible using soap and water.  Use 

Neosporin to promote healing.  Do not soak or submerge wound.  Where postop 

shoe until cleared by podiatrist.  Return to the emergency department if 

symptoms get worse.


***Med/Other Pt SpecificInfo:  Prescription(s) given


Scripts


Fluconazole (Diflucan) 150 Mg Tab


150 MG PO ONCE for Infection, #1 TAB 0 Refills


   Prov: Chrissie Granados DO         18 


Ciprofloxacin (Cipro) 250 Mg Tab


250 MG PO BID for Infection for 5 Days, #10 TAB 0 Refills


   Prov: Chrissie Granados DO         18


Disposition:  01 DISCHARGE HOME


Condition:  Stable











Hemant Tolbert 2018 17:12

## 2018-02-23 ENCOUNTER — HOSPITAL ENCOUNTER (EMERGENCY)
Dept: HOSPITAL 17 - PHED | Age: 48
Discharge: HOME | End: 2018-02-23
Payer: COMMERCIAL

## 2018-02-23 VITALS — BODY MASS INDEX: 35.76 KG/M2 | HEIGHT: 64 IN | WEIGHT: 209.44 LBS

## 2018-02-23 VITALS
HEART RATE: 84 BPM | TEMPERATURE: 98.6 F | OXYGEN SATURATION: 97 % | DIASTOLIC BLOOD PRESSURE: 82 MMHG | SYSTOLIC BLOOD PRESSURE: 150 MMHG | RESPIRATION RATE: 18 BRPM

## 2018-02-23 VITALS — DIASTOLIC BLOOD PRESSURE: 72 MMHG | SYSTOLIC BLOOD PRESSURE: 147 MMHG

## 2018-02-23 DIAGNOSIS — F32.9: ICD-10-CM

## 2018-02-23 DIAGNOSIS — L08.9: ICD-10-CM

## 2018-02-23 DIAGNOSIS — J45.909: ICD-10-CM

## 2018-02-23 DIAGNOSIS — I10: ICD-10-CM

## 2018-02-23 DIAGNOSIS — E11.9: ICD-10-CM

## 2018-02-23 DIAGNOSIS — E78.00: ICD-10-CM

## 2018-02-23 DIAGNOSIS — F41.9: ICD-10-CM

## 2018-02-23 DIAGNOSIS — S91.312D: Primary | ICD-10-CM
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
## 2018-02-23 PROCEDURE — 99283 EMERGENCY DEPT VISIT LOW MDM: CPT

## 2018-02-23 NOTE — PD
HPI


Chief Complaint:  Laceration/Skin Injury


Time Seen by Provider:  04:50


Travel History


International Travel<30 days:  No


Contact w/Intl Traveler<30days:  No


Traveled to known affect area:  No





History of Present Illness


HPI


The patient is a 47-year-old female that had her left foot sutured on Wednesday 

following a household broken glass laceration.  The patient states that the 

foot is swollen and painful.  The patient is already on Cipro and amoxicillin.  

The Cipro is for the laceration and the amoxicillin is for a dental infection.  

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  She is a non-

insulin-dependent diabetic.  She is allergic to sulfa antibiotics.





PFSH


Past Medical History


Arthritis:  No


Asthma:  Yes


Anxiety:  Yes


Depression:  Yes


Heart Rhythm Problems:  No


Cancer:  No


Cardiac Catheterization:  No


Cardiovascular Problems:  Yes


High Cholesterol:  Yes (at one time, pt takes fish oil)


Chest Pain:  No


Congestive Heart Failure:  No


COPD:  No


Diabetes:  Yes


Patient Takes Glucophage:  Yes


Diminished Hearing:  No


Endocrine:  Yes


Gastrointestinal Disorders:  Yes (PAIN TO RIGHT UPPER QUAD, WORSE THIS TIME)


GERD:  Yes


Genitourinary:  No


Hiatal Hernia:  No


Heparin Induced Thrombocytopen:  No


Hypertension:  Yes


Immune Disorder:  No


Implanted Vascular Access Dvce:  No


Kidney Stones:  No


Musculoskeletal:  Yes


Neurologic:  No


Psychiatric:  Yes


Reproductive:  Yes (HYSTERECTOMY)


Respiratory:  Yes


Immunizations Current:  Yes


Migraines:  No


Pancreatitis:  Yes


Sleep Apnea:  No


Thyroid Disease:  No


Ulcer:  Yes


Pregnant?:  Not Pregnant


:  1


Para:  1





Past Surgical History


Abdominal Surgery:  Yes (ENDOMETRIAL TISSUE REMOVED )


Appendectomy:  Yes


Cardiac Surgery:  No


 Section:  Yes ()


Cholecystectomy:  Yes


Coronary Artery Bypass Graft:  No


Ear Surgery:  No


Endocrine Surgery:  No


Eye Surgery:  No


Genitourinary Surgery:  No


Gynecologic Surgery:  Yes (EXPLORATORY LAP , EXPL LAP  C CAUTERIZATION  

)


Hysterectomy:  Yes


Insulin Pump:  No


Joint Replacement:  No


Neurologic Surgery:  No


Oral Surgery:  No


Pacemaker:  No


Thoracic Surgery:  No


Other Surgery:  Yes (EXP LAP, EXP ABD SURGERY)





Social History


Alcohol Use:  No


Tobacco Use:  No


Substance Use:  No





Allergies-Medications


(Allergen,Severity, Reaction):  


Coded Allergies:  


     Sulfa (Sulfonamide Antibiotics) (Unverified  Allergy, Intermediate, HIVES, 

18)


     morphine (Unverified  Allergy, Intermediate, HIVES, 18)


Reported Meds & Prescriptions





Reported Meds & Active Scripts


Active


Cipro (Ciprofloxacin HCl) 250 Mg Tab 250 Mg PO BID 5 Days


Metformin (Metformin HCl) 1,000 Mg Tab 1,000 Mg PO BIDPC


     With meals


Reported


Trazodone (Trazodone HCl) 150 Mg Tablet 150 Mg PO HS


Celexa (Citalopram Hydrobromide) 40 Mg Tab 60 Mg PO DAILY


Hydrocodone-Acetaminophen 7.5-325 mg Tab 1 Tab PO DAILY PRN


Proventil Hfa 6.7 GM Inh (Albuterol Sulfate) 90 Mcg/Act Aer 1 Puff INH Q4H PRN


Metoprolol Tartrate 25 Mg Tab 25 Mg PO BID








Review of Systems


Except as stated in HPI:  all other systems reviewed are Neg





Physical Exam


Narrative


GENERAL: Well-nourished, well-developed patient.


SKIN: Focused skin assessment warm/dry.  The laceration shows erythema around 

the wound edges and the foot is tender.  There is no pus coming out of the 

sutures or from the laceration.


HEAD: Normocephalic.


EYES: No scleral icterus. No injection or drainage. 


NECK: Supple, trachea midline. No JVD or lymphadenopathy.


CARDIOVASCULAR: Regular rate and rhythm without murmurs, gallops, or rubs. 


RESPIRATORY: Breath sounds equal bilaterally. No accessory muscle use.


GASTROINTESTINAL: Abdomen soft, non-tender, nondistended. 


MUSCULOSKELETAL: No cyanosis, or edema. 


BACK: Nontender without obvious deformity. No CVA tenderness.





Data


Data


Last Documented VS





Vital Signs








  Date Time  Temp Pulse Resp B/P (MAP) Pulse Ox O2 Delivery O2 Flow Rate FiO2


 


18 03:08 98.6 84 18 150/82 (104) 97   











MDM


Medical Decision Making


Medical Screen Exam Complete:  Yes


Emergency Medical Condition:  Yes


Medical Record Reviewed:  Yes


Differential Diagnosis


Infected laceration, laceration pain, swelling from lack of elevation


Narrative Course


The patient may have an early infected laceration but this is questionable at 

this time.  She does have considerable pain and there is swelling around the 

laceration because of lack of proper elevation.





Plan: The patient will be given a work excuse to return to work after cleared 

by the wound clinic.  She will need to see the wound clinic as soon as possible 

and should call later on this morning to set up an appointment.  She will be 

put on doxycycline, the patient is allergic to sulfa antibiotics.





Diagnosis





 Primary Impression:  


 Infected laceration


***Med/Other Pt SpecificInfo:  Prescription(s) given


Scripts


Sulfamethoxazole-Trimethoprim (Bactrim DS) 800-160 Mg Tab


1 TAB PO BID for Infection, #20 TAB 0 Refills


   Prov: Yuri Diego MD         18 


Hydrocodone-Acetaminophen (Hydrocodone-Acetaminophen)  mg Tab


1 TAB PO Q4H Y for PAIN, #30 TAB 0 Refills


   Prov: Yuri Diego MD         18


Disposition:  01 DISCHARGE HOME


Condition:  Stable











Yuri Diego MD 2018 04:58

## 2018-06-04 ENCOUNTER — HOSPITAL ENCOUNTER (EMERGENCY)
Dept: HOSPITAL 17 - PHED | Age: 48
LOS: 1 days | Discharge: HOME | End: 2018-06-05
Payer: COMMERCIAL

## 2018-06-04 VITALS
RESPIRATION RATE: 18 BRPM | SYSTOLIC BLOOD PRESSURE: 149 MMHG | OXYGEN SATURATION: 94 % | TEMPERATURE: 98.5 F | DIASTOLIC BLOOD PRESSURE: 72 MMHG | HEART RATE: 105 BPM

## 2018-06-04 VITALS — BODY MASS INDEX: 37.64 KG/M2 | HEIGHT: 64 IN | WEIGHT: 220.46 LBS

## 2018-06-04 DIAGNOSIS — F41.9: ICD-10-CM

## 2018-06-04 DIAGNOSIS — K21.9: ICD-10-CM

## 2018-06-04 DIAGNOSIS — J45.909: ICD-10-CM

## 2018-06-04 DIAGNOSIS — R94.31: ICD-10-CM

## 2018-06-04 DIAGNOSIS — E78.00: ICD-10-CM

## 2018-06-04 DIAGNOSIS — E11.65: Primary | ICD-10-CM

## 2018-06-04 DIAGNOSIS — T38.0X5A: ICD-10-CM

## 2018-06-04 DIAGNOSIS — I10: ICD-10-CM

## 2018-06-04 DIAGNOSIS — F32.9: ICD-10-CM

## 2018-06-04 LAB
ALBUMIN SERPL-MCNC: 4.3 GM/DL (ref 3.4–5)
ALP SERPL-CCNC: 160 U/L (ref 45–117)
ALT SERPL-CCNC: 45 U/L (ref 10–53)
AST SERPL-CCNC: 29 U/L (ref 15–37)
BASOPHILS # BLD AUTO: 0.2 TH/MM3 (ref 0–0.2)
BASOPHILS NFR BLD: 1.7 % (ref 0–2)
BILIRUB SERPL-MCNC: 0.4 MG/DL (ref 0.2–1)
BUN SERPL-MCNC: 17 MG/DL (ref 7–18)
CALCIUM SERPL-MCNC: 9.4 MG/DL (ref 8.5–10.1)
CHLORIDE SERPL-SCNC: 93 MEQ/L (ref 98–107)
CREAT SERPL-MCNC: 1.2 MG/DL (ref 0.5–1)
EOSINOPHIL # BLD: 0 TH/MM3 (ref 0–0.4)
EOSINOPHIL NFR BLD: 0.1 % (ref 0–4)
ERYTHROCYTE [DISTWIDTH] IN BLOOD BY AUTOMATED COUNT: 11.6 % (ref 11.6–17.2)
GFR SERPLBLD BASED ON 1.73 SQ M-ARVRAT: 49 ML/MIN (ref 89–?)
GLUCOSE SERPL-MCNC: 622 MG/DL (ref 74–106)
HCO3 BLD-SCNC: 22.7 MEQ/L (ref 21–32)
HCT VFR BLD CALC: 48.4 % (ref 35–46)
HGB BLD-MCNC: 15.7 GM/DL (ref 11.6–15.3)
LYMPHOCYTES # BLD AUTO: 1.1 TH/MM3 (ref 1–4.8)
LYMPHOCYTES NFR BLD AUTO: 11.1 % (ref 9–44)
MCH RBC QN AUTO: 28.6 PG (ref 27–34)
MCHC RBC AUTO-ENTMCNC: 32.4 % (ref 32–36)
MCV RBC AUTO: 88.2 FL (ref 80–100)
MONOCYTE #: 0.1 TH/MM3 (ref 0–0.9)
MONOCYTES NFR BLD: 0.7 % (ref 0–8)
NEUTROPHILS # BLD AUTO: 8.7 TH/MM3 (ref 1.8–7.7)
NEUTROPHILS NFR BLD AUTO: 86.4 % (ref 16–70)
PLATELET # BLD: 273 TH/MM3 (ref 150–450)
PMV BLD AUTO: 9.2 FL (ref 7–11)
PROT SERPL-MCNC: 8.3 GM/DL (ref 6.4–8.2)
RBC # BLD AUTO: 5.49 MIL/MM3 (ref 4–5.3)
SODIUM SERPL-SCNC: 128 MEQ/L (ref 136–145)
TROPONIN I SERPL-MCNC: (no result) NG/ML (ref 0.02–0.05)
WBC # BLD AUTO: 10.1 TH/MM3 (ref 4–11)

## 2018-06-04 PROCEDURE — 71045 X-RAY EXAM CHEST 1 VIEW: CPT

## 2018-06-04 PROCEDURE — 96372 THER/PROPH/DIAG INJ SC/IM: CPT

## 2018-06-04 PROCEDURE — 96361 HYDRATE IV INFUSION ADD-ON: CPT

## 2018-06-04 PROCEDURE — 93005 ELECTROCARDIOGRAM TRACING: CPT

## 2018-06-04 PROCEDURE — 96374 THER/PROPH/DIAG INJ IV PUSH: CPT

## 2018-06-04 PROCEDURE — 83690 ASSAY OF LIPASE: CPT

## 2018-06-04 PROCEDURE — 94664 DEMO&/EVAL PT USE INHALER: CPT

## 2018-06-04 PROCEDURE — 84484 ASSAY OF TROPONIN QUANT: CPT

## 2018-06-04 PROCEDURE — 85025 COMPLETE CBC W/AUTO DIFF WBC: CPT

## 2018-06-04 PROCEDURE — 96375 TX/PRO/DX INJ NEW DRUG ADDON: CPT

## 2018-06-04 PROCEDURE — 99285 EMERGENCY DEPT VISIT HI MDM: CPT

## 2018-06-04 PROCEDURE — 80053 COMPREHEN METABOLIC PANEL: CPT

## 2018-06-04 PROCEDURE — 82800 BLOOD PH: CPT

## 2018-06-04 NOTE — RADRPT
EXAM DATE:  6/4/2018 11:13 PM EDT

AGE/SEX:        47 years / Female



INDICATIONS:  Shortness of breath.



CLINICAL DATA:  This is the patient's initial encounter. Patient reports that signs and symptoms have
 been present for 1 day and indicates a pain score of 0/10. 

                                                                          

MEDICAL/SURGICAL HISTORY:       Asthma.  Hypertension. None.



COMPARISON:      [facility], CHEST SINGLE AP, 7/2/2013.  .



FINDINGS:  

A single AP view of the chest demonstrates the lungs to be symmetrically aerated without evidence of 
mass, infiltrate or effusion.  The cardiomediastinal contours are unremarkable.  Osseous structures a
re intact.  





CONCLUSION: 

No acute pulmonary infiltrates. No significant changes.



Electronically signed by: Antwon Sims MD  6/4/2018 11:21 PM EDT

## 2018-06-05 NOTE — EKG
Date Performed: 2018       Time Performed: 23:24:13

 

PTAGE:      47 years

 

EKG:      SINUS TACHYCARDIA INCOMPLETE RIGHT BUNDLE BRANCH BLOCK NONSPECIFIC ST ELEVATION ABNORMAL RH
YT ECG Since the 

 

 PREVIOUS TRACING            , no significant change noted PREVIOUS TRACIN2013 22.07

 

DOCTOR:   Sasha Grover  Interpretating Date/Time  2018 16:09:15

## 2018-06-13 ENCOUNTER — HOSPITAL ENCOUNTER (EMERGENCY)
Dept: HOSPITAL 17 - PHED | Age: 48
Discharge: HOME | End: 2018-06-13
Payer: COMMERCIAL

## 2018-06-13 VITALS
HEART RATE: 82 BPM | DIASTOLIC BLOOD PRESSURE: 72 MMHG | RESPIRATION RATE: 18 BRPM | OXYGEN SATURATION: 96 % | SYSTOLIC BLOOD PRESSURE: 132 MMHG

## 2018-06-13 VITALS — DIASTOLIC BLOOD PRESSURE: 61 MMHG | SYSTOLIC BLOOD PRESSURE: 125 MMHG

## 2018-06-13 VITALS
DIASTOLIC BLOOD PRESSURE: 73 MMHG | OXYGEN SATURATION: 96 % | RESPIRATION RATE: 18 BRPM | SYSTOLIC BLOOD PRESSURE: 147 MMHG | HEART RATE: 82 BPM

## 2018-06-13 VITALS — WEIGHT: 210.21 LBS | HEIGHT: 64 IN | BODY MASS INDEX: 35.89 KG/M2

## 2018-06-13 VITALS
HEART RATE: 93 BPM | OXYGEN SATURATION: 99 % | RESPIRATION RATE: 16 BRPM | TEMPERATURE: 99 F | DIASTOLIC BLOOD PRESSURE: 79 MMHG | SYSTOLIC BLOOD PRESSURE: 139 MMHG

## 2018-06-13 DIAGNOSIS — K85.90: Primary | ICD-10-CM

## 2018-06-13 DIAGNOSIS — Z79.4: ICD-10-CM

## 2018-06-13 DIAGNOSIS — Z79.899: ICD-10-CM

## 2018-06-13 DIAGNOSIS — E10.9: ICD-10-CM

## 2018-06-13 DIAGNOSIS — Z88.2: ICD-10-CM

## 2018-06-13 DIAGNOSIS — J45.909: ICD-10-CM

## 2018-06-13 DIAGNOSIS — E78.00: ICD-10-CM

## 2018-06-13 DIAGNOSIS — I10: ICD-10-CM

## 2018-06-13 DIAGNOSIS — F41.9: ICD-10-CM

## 2018-06-13 DIAGNOSIS — F32.9: ICD-10-CM

## 2018-06-13 DIAGNOSIS — Z88.5: ICD-10-CM

## 2018-06-13 DIAGNOSIS — K21.9: ICD-10-CM

## 2018-06-13 LAB
ALBUMIN SERPL-MCNC: 4 GM/DL (ref 3.4–5)
ALP SERPL-CCNC: 90 U/L (ref 45–117)
ALT SERPL-CCNC: 47 U/L (ref 10–53)
AST SERPL-CCNC: 25 U/L (ref 15–37)
BASOPHILS # BLD AUTO: 0 TH/MM3 (ref 0–0.2)
BASOPHILS NFR BLD: 0.4 % (ref 0–2)
BILIRUB SERPL-MCNC: 0.3 MG/DL (ref 0.2–1)
BUN SERPL-MCNC: 12 MG/DL (ref 7–18)
CALCIUM SERPL-MCNC: 8.8 MG/DL (ref 8.5–10.1)
CHLORIDE SERPL-SCNC: 102 MEQ/L (ref 98–107)
CREAT SERPL-MCNC: 0.62 MG/DL (ref 0.5–1)
EOSINOPHIL # BLD: 0.2 TH/MM3 (ref 0–0.4)
EOSINOPHIL NFR BLD: 2 % (ref 0–4)
ERYTHROCYTE [DISTWIDTH] IN BLOOD BY AUTOMATED COUNT: 11.8 % (ref 11.6–17.2)
GFR SERPLBLD BASED ON 1.73 SQ M-ARVRAT: 104 ML/MIN (ref 89–?)
GLUCOSE SERPL-MCNC: 272 MG/DL (ref 74–106)
HCO3 BLD-SCNC: 24.5 MEQ/L (ref 21–32)
HCT VFR BLD CALC: 44.9 % (ref 35–46)
HGB BLD-MCNC: 15.2 GM/DL (ref 11.6–15.3)
LYMPHOCYTES # BLD AUTO: 3.4 TH/MM3 (ref 1–4.8)
LYMPHOCYTES NFR BLD AUTO: 39.8 % (ref 9–44)
MCH RBC QN AUTO: 30.5 PG (ref 27–34)
MCHC RBC AUTO-ENTMCNC: 33.8 % (ref 32–36)
MCV RBC AUTO: 90.1 FL (ref 80–100)
MONOCYTE #: 0.5 TH/MM3 (ref 0–0.9)
MONOCYTES NFR BLD: 5.7 % (ref 0–8)
NEUTROPHILS # BLD AUTO: 4.4 TH/MM3 (ref 1.8–7.7)
NEUTROPHILS NFR BLD AUTO: 52.1 % (ref 16–70)
PLATELET # BLD: 223 TH/MM3 (ref 150–450)
PMV BLD AUTO: 8.9 FL (ref 7–11)
PROT SERPL-MCNC: 7.4 GM/DL (ref 6.4–8.2)
RBC # BLD AUTO: 4.98 MIL/MM3 (ref 4–5.3)
SODIUM SERPL-SCNC: 135 MEQ/L (ref 136–145)
WBC # BLD AUTO: 8.5 TH/MM3 (ref 4–11)

## 2018-06-13 PROCEDURE — 96374 THER/PROPH/DIAG INJ IV PUSH: CPT

## 2018-06-13 PROCEDURE — 83690 ASSAY OF LIPASE: CPT

## 2018-06-13 PROCEDURE — 99284 EMERGENCY DEPT VISIT MOD MDM: CPT

## 2018-06-13 PROCEDURE — 80053 COMPREHEN METABOLIC PANEL: CPT

## 2018-06-13 PROCEDURE — 85025 COMPLETE CBC W/AUTO DIFF WBC: CPT

## 2018-06-13 PROCEDURE — 84703 CHORIONIC GONADOTROPIN ASSAY: CPT

## 2018-06-13 PROCEDURE — 96361 HYDRATE IV INFUSION ADD-ON: CPT

## 2018-06-13 PROCEDURE — 96375 TX/PRO/DX INJ NEW DRUG ADDON: CPT

## 2018-06-13 PROCEDURE — C9113 INJ PANTOPRAZOLE SODIUM, VIA: HCPCS

## 2018-06-13 NOTE — PD
HPI


Chief Complaint:  General Weakness


Time Seen by Provider:  02:50


Travel History


International Travel<30 days:  No


Contact w/Intl Traveler<30days:  No


Traveled to known affect area:  No





History of Present Illness


HPI


47-year-old female with past medical history of type 1 diabetes, asthma, 

chronic pancreatitis presents to the emergency room was persistent generalized 

weakness, nausea, vomiting, diarrhea, recurrent intermittent abdominal pain, 

blurring of vision, dizziness for the last 2 days.  Patient had a persistent 

hyperglycemia since  after receiving a Decadron shot for asthma.  Since 

that time the patient has been on Lantus and sliding scale insulin.  This 

morning patient's fasting blood sugar was 409.  Patient denies any fever, chills

, cough, dysuria.





PFSH


Past Medical History


Arthritis:  No


Asthma:  Yes


Anxiety:  Yes


Depression:  Yes


Heart Rhythm Problems:  No


Cancer:  No


Cardiac Catheterization:  No


Cardiovascular Problems:  Yes


High Cholesterol:  Yes (at one time, pt takes fish oil)


Chest Pain:  No


Congestive Heart Failure:  No


COPD:  No


Diabetes:  Yes


Patient Takes Glucophage:  Yes (18 at 1630)


Diminished Hearing:  No


Endocrine:  Yes


Gastrointestinal Disorders:  Yes (PAIN TO RIGHT UPPER QUAD, WORSE THIS TIME)


GERD:  Yes


Genitourinary:  No


Hiatal Hernia:  No


Heparin Induced Thrombocytopen:  No


Hypertension:  Yes


Immune Disorder:  No


Implanted Vascular Access Dvce:  No


Kidney Stones:  No


Musculoskeletal:  Yes


Neurologic:  No


Psychiatric:  Yes


Reproductive:  Yes (HYSTERECTOMY)


Respiratory:  Yes


Immunizations Current:  Yes


Migraines:  No


Pancreatitis:  Yes


Sleep Apnea:  No


Thyroid Disease:  No


Ulcer:  Yes


Tetanus Vaccination:  < 5 Years


Influenza Vaccination:  Yes


Pregnant?:  Not Pregnant


:  1


Para:  1





Past Surgical History


Abdominal Surgery:  Yes (ENDOMETRIAL TISSUE REMOVED )


Appendectomy:  Yes


Cardiac Surgery:  No


 Section:  Yes ()


Cholecystectomy:  Yes


Coronary Artery Bypass Graft:  No


Ear Surgery:  No


Endocrine Surgery:  No


Eye Surgery:  No


Genitourinary Surgery:  No


Gynecologic Surgery:  Yes (EXPLORATORY LAP , EXPL LAP  C CAUTERIZATION  

)


Hysterectomy:  Yes (Complete)


Insulin Pump:  No


Joint Replacement:  No


Neurologic Surgery:  No


Oral Surgery:  No


Pacemaker:  No


Thoracic Surgery:  No


Other Surgery:  Yes (EXP LAP, EXP ABD SURGERY)





Social History


Alcohol Use:  No


Tobacco Use:  No


Substance Use:  No





Allergies-Medications


(Allergen,Severity, Reaction):  


Coded Allergies:  


     Sulfa (Sulfonamide Antibiotics) (Verified  Allergy, Intermediate, HIVES, )


     morphine (Verified  Allergy, Intermediate, HIVES, 18)


     shellfish derived (Verified  Allergy, Unknown, Anaphylaxis, 18)


Reported Meds & Prescriptions





Reported Meds & Active Scripts


Active


Omeprazole 40 Mg Cap 40 Mg PO DAILY


Zofran Odt (Ondansetron Odt) 4 Mg Tab 4 Mg SL Q6HR PRN


Metformin (Metformin HCl) 1,000 Mg Tab 1,000 Mg PO BIDPC


     With meals


Reported


Mobic (Meloxicam) 7.5 Mg Tab 7.5 Mg PO DAILY


Singulair (Montelukast Sodium) 10 Mg Tab 10 Mg PO DAILY


Lisinopril 5 Mg Tab 5 Mg PO DAILY


Humulin R Inj (Insulin Human Regular) 1,000 Unit/10 Ml Vial 2-10 Units SQ TIDAC 

PRN


     IMPORTANT TO EAT A MEAL WITHIN 30-60 MINUTES OF DOSING


Lantus Inj (Insulin Glargine) 100 Unit/Ml Inj 15 Units SQ DAILY


Trazodone (Trazodone HCl) 150 Mg Tablet 150 Mg PO HS


Celexa (Citalopram Hydrobromide) 40 Mg Tab 60 Mg PO DAILY


Hydrocodone-Acetaminophen 7.5-325 mg Tab 1 Tab PO DAILY PRN


Proventil Hfa 6.7 GM Inh (Albuterol Sulfate) 90 Mcg/Act Aer 1 Puff INH Q4H PRN


Metoprolol Tartrate 25 Mg Tab 25 Mg PO BID








Review of Systems


Except as stated in HPI:  all other systems reviewed are Neg


General / Constitutional:  No: Fever, Chills


Eyes:  Positive: Blurred Vision


HENT:  No: Rhinorrhea, Congestion, Neck Stiffness, Neck Pain, Earache


Cardiovascular:  Positive: Palpitations


Respiratory:  No: Cough, Shortness of Breath, Wheezing


Gastrointestinal:  Positive: Nausea, Vomiting, Diarrhea, Abdominal Pain


Genitourinary:  No: Dysuria


Musculoskeletal:  No: Myalgias


Skin:  No Rash, No Hives


Neurologic:  No: Weakness, Dizziness, Syncope, Headache, Slurred Speech, 

Seizures


Psychiatric:  No: Suicidal Ideations





Physical Exam


Narrative





Vital Signs








  Date Time  Temp Pulse Resp B/P (MAP) Pulse Ox O2 Delivery O2 Flow Rate FiO2


 


18 03:10   18  97   


 


18 02:32 99.0 93 16 139/79 (99) 99   





GENERAL: Patient is alert and oriented -3


SKIN: Focused skin assessment warm/dry.


HEAD: Atraumatic. Normocephalic. 


EYES: Pupils equal and round. No scleral icterus. No injection or drainage. 


ENT: No nasal bleeding or discharge.  Mucous membranes pink and moist.


NECK: Trachea midline. No JVD. 


CARDIOVASCULAR: Regular rate and rhythm.  No murmur appreciated.


RESPIRATORY: No accessory muscle use. Clear to auscultation. Breath sounds 

equal bilaterally. 


GASTROINTESTINAL: Abdomen soft, non-tender, nondistended. Hepatic and splenic 

margins not palpable. 


MUSCULOSKELETAL: No obvious deformities. No clubbing.  No cyanosis.  No edema. 


NEUROLOGICAL: Awake and alert. No obvious cranial nerve deficits.  Motor 

grossly within normal limits. Normal speech.


PSYCHIATRIC: Appropriate mood and affect; insight and judgment normal.





Data


Data


Last Documented VS





Vital Signs








  Date Time  Temp Pulse Resp B/P (MAP) Pulse Ox O2 Delivery O2 Flow Rate FiO2


 


18 05:17  74 18 125/61 (82) 96   


 


18 04:01      Room Air  


 


18 02:32 99.0       











Vital Signs








  Date Time  Temp Pulse Resp B/P (MAP) Pulse Ox O2 Delivery O2 Flow Rate FiO2


 


18 05:17  74 18 125/61 (82) 96   


 


18 04:01  82 18 132/72 (92) 96 Room Air  


 


18 03:10   18  97   


 


18 03:00  82 18 147/73 (97) 96 Room Air  


 


18 02:32 99.0 93 16 139/79 (99) 99   








Orders





 Orders


Complete Blood Count With Diff (18 02:59)


Comprehensive Metabolic Panel (18 02:59)


Lipase (18 02:59)


Iv Access Insert/Monitor (18 02:59)


Ecg Monitoring (18 02:59)


Oximetry (18 02:59)


Ondansetron Inj (Zofran Inj) (18 03:00)


Pantoprazole Inj (Protonix Inj) (18 03:00)


Sodium Chlor 0.9% 1000 Ml Inj (Ns 1000 M (18 02:59)


Sodium Chloride 0.9% Flush (Ns Flush) (18 03:00)


Ed Urine Pregnancytest Poc (18 02:59)


Ed Discharge Order (18 04:07)


Sodium Chlor 0.9% 1000 Ml Inj (Ns 1000 M (18 04:30)





Labs





Laboratory Tests








Test


  18


03:30


 


White Blood Count 8.5 TH/MM3 


 


Red Blood Count 4.98 MIL/MM3 


 


Hemoglobin 15.2 GM/DL 


 


Hematocrit 44.9 % 


 


Mean Corpuscular Volume 90.1 FL 


 


Mean Corpuscular Hemoglobin 30.5 PG 


 


Mean Corpuscular Hemoglobin


Concent 33.8 % 


 


 


Red Cell Distribution Width 11.8 % 


 


Platelet Count 223 TH/MM3 


 


Mean Platelet Volume 8.9 FL 


 


Neutrophils (%) (Auto) 52.1 % 


 


Lymphocytes (%) (Auto) 39.8 % 


 


Monocytes (%) (Auto) 5.7 % 


 


Eosinophils (%) (Auto) 2.0 % 


 


Basophils (%) (Auto) 0.4 % 


 


Neutrophils # (Auto) 4.4 TH/MM3 


 


Lymphocytes # (Auto) 3.4 TH/MM3 


 


Monocytes # (Auto) 0.5 TH/MM3 


 


Eosinophils # (Auto) 0.2 TH/MM3 


 


Basophils # (Auto) 0.0 TH/MM3 


 


CBC Comment DIFF FINAL 


 


Differential Comment  


 


Blood Urea Nitrogen 12 MG/DL 


 


Creatinine 0.62 MG/DL 


 


Random Glucose 272 MG/DL 


 


Total Protein 7.4 GM/DL 


 


Albumin 4.0 GM/DL 


 


Calcium Level 8.8 MG/DL 


 


Alkaline Phosphatase 90 U/L 


 


Aspartate Amino Transf


(AST/SGOT) 25 U/L 


 


 


Alanine Aminotransferase


(ALT/SGPT) 47 U/L 


 


 


Total Bilirubin 0.3 MG/DL 


 


Sodium Level 135 MEQ/L 


 


Potassium Level 3.9 MEQ/L 


 


Chloride Level 102 MEQ/L 


 


Carbon Dioxide Level 24.5 MEQ/L 


 


Anion Gap 9 MEQ/L 


 


Estimat Glomerular Filtration


Rate 104 ML/MIN 


 


 


Lipase 682 U/L 











[facility]


Medical Decision Making


Medical Screen Exam Complete:  Yes


Emergency Medical Condition:  Yes


Medical Record Reviewed:  Yes


Differential Diagnosis


DKA, hyperglycemia, UTI, gastroenteritis, sepsis,


Narrative Course


Patient condition improved during the ER course with IV fluids and nausea 

medication.  Patient feels much better and she will be discharged home.  Blood 

glucose level improved to 200+.





Diagnosis





 Primary Impression:  


 Abdominal pain


 Additional Impressions:  


 DM2 (diabetes mellitus, type 2)


 Pancreatitis


Referrals:  


Primary Care Physician


2 days


Scripts


Omeprazole (Omeprazole) 40 Mg Cap


40 MG PO DAILY, #14 CAP 0 Refills


   Prov: Vin Jorgensen MD         18 


Ondansetron Odt (Zofran Odt) 4 Mg Tab


4 MG SL Q6HR Y for Nausea/Vomiting, #30 TAB 0 Refills


   Prov: Vin Jorgensen MD         18


Disposition:  01 DISCHARGE HOME


Condition:  Stable











Vin Jorgensen MD 2018 03:23